# Patient Record
Sex: MALE | Race: WHITE | NOT HISPANIC OR LATINO
[De-identification: names, ages, dates, MRNs, and addresses within clinical notes are randomized per-mention and may not be internally consistent; named-entity substitution may affect disease eponyms.]

---

## 2017-01-23 ENCOUNTER — APPOINTMENT (OUTPATIENT)
Dept: ELECTROPHYSIOLOGY | Facility: HOSPITAL | Age: 82
End: 2017-01-23

## 2017-04-24 ENCOUNTER — APPOINTMENT (OUTPATIENT)
Dept: ELECTROPHYSIOLOGY | Facility: CLINIC | Age: 82
End: 2017-04-24

## 2017-05-05 ENCOUNTER — APPOINTMENT (OUTPATIENT)
Dept: CARDIOLOGY | Facility: CLINIC | Age: 82
End: 2017-05-05

## 2017-05-08 ENCOUNTER — RX RENEWAL (OUTPATIENT)
Age: 82
End: 2017-05-08

## 2017-06-12 ENCOUNTER — APPOINTMENT (OUTPATIENT)
Dept: CARDIOLOGY | Facility: CLINIC | Age: 82
End: 2017-06-12

## 2017-06-28 ENCOUNTER — APPOINTMENT (OUTPATIENT)
Dept: CARDIOLOGY | Facility: CLINIC | Age: 82
End: 2017-06-28

## 2017-07-14 ENCOUNTER — EMERGENCY (EMERGENCY)
Facility: HOSPITAL | Age: 82
LOS: 1 days | End: 2017-07-14
Payer: MEDICARE

## 2017-07-14 PROCEDURE — 99283 EMERGENCY DEPT VISIT LOW MDM: CPT

## 2017-07-24 ENCOUNTER — APPOINTMENT (OUTPATIENT)
Dept: ELECTROPHYSIOLOGY | Facility: CLINIC | Age: 82
End: 2017-07-24
Payer: MEDICARE

## 2017-07-24 PROCEDURE — 93298 REM INTERROG DEV EVAL SCRMS: CPT

## 2017-09-06 ENCOUNTER — APPOINTMENT (OUTPATIENT)
Dept: CARDIOLOGY | Facility: CLINIC | Age: 82
End: 2017-09-06
Payer: MEDICARE

## 2017-09-06 PROCEDURE — 99215 OFFICE O/P EST HI 40 MIN: CPT

## 2017-10-24 ENCOUNTER — RECORD ABSTRACTING (OUTPATIENT)
Age: 82
End: 2017-10-24

## 2017-10-24 DIAGNOSIS — Z82.49 FAMILY HISTORY OF ISCHEMIC HEART DISEASE AND OTHER DISEASES OF THE CIRCULATORY SYSTEM: ICD-10-CM

## 2017-10-24 DIAGNOSIS — Z86.79 PERSONAL HISTORY OF OTHER DISEASES OF THE CIRCULATORY SYSTEM: ICD-10-CM

## 2017-10-24 DIAGNOSIS — R06.02 SHORTNESS OF BREATH: ICD-10-CM

## 2017-10-24 DIAGNOSIS — R25.2 CRAMP AND SPASM: ICD-10-CM

## 2017-10-24 DIAGNOSIS — E03.9 HYPOTHYROIDISM, UNSPECIFIED: ICD-10-CM

## 2017-10-28 ENCOUNTER — EMERGENCY (EMERGENCY)
Facility: HOSPITAL | Age: 82
LOS: 1 days | End: 2017-10-28
Payer: MEDICARE

## 2017-10-28 PROCEDURE — 99283 EMERGENCY DEPT VISIT LOW MDM: CPT

## 2017-10-31 ENCOUNTER — APPOINTMENT (OUTPATIENT)
Dept: ELECTROPHYSIOLOGY | Facility: CLINIC | Age: 82
End: 2017-10-31
Payer: MEDICARE

## 2017-10-31 PROCEDURE — 93294 REM INTERROG EVL PM/LDLS PM: CPT

## 2017-11-01 ENCOUNTER — APPOINTMENT (OUTPATIENT)
Dept: ELECTROPHYSIOLOGY | Facility: CLINIC | Age: 82
End: 2017-11-01

## 2017-11-15 ENCOUNTER — INPATIENT (INPATIENT)
Facility: HOSPITAL | Age: 82
LOS: 0 days | Discharge: HOME | End: 2017-11-16
Payer: MEDICARE

## 2017-11-15 ENCOUNTER — OUTPATIENT (OUTPATIENT)
Dept: OUTPATIENT SERVICES | Facility: HOSPITAL | Age: 82
LOS: 1 days | End: 2017-11-15

## 2017-11-15 PROCEDURE — 72192 CT PELVIS W/O DYE: CPT | Mod: 26

## 2017-11-15 PROCEDURE — 93971 EXTREMITY STUDY: CPT | Mod: 26,RT

## 2017-11-15 PROCEDURE — 74177 CT ABD & PELVIS W/CONTRAST: CPT | Mod: 26

## 2017-11-15 PROCEDURE — 73552 X-RAY EXAM OF FEMUR 2/>: CPT | Mod: 26,RT

## 2017-11-15 PROCEDURE — 74176 CT ABD & PELVIS W/O CONTRAST: CPT | Mod: 26

## 2017-11-15 PROCEDURE — 99285 EMERGENCY DEPT VISIT HI MDM: CPT

## 2017-11-15 PROCEDURE — 73502 X-RAY EXAM HIP UNI 2-3 VIEWS: CPT | Mod: 26,RT

## 2017-11-16 ENCOUNTER — OUTPATIENT (OUTPATIENT)
Dept: OUTPATIENT SERVICES | Facility: HOSPITAL | Age: 82
LOS: 1 days | End: 2017-11-16

## 2017-11-16 PROCEDURE — 99223 1ST HOSP IP/OBS HIGH 75: CPT

## 2017-11-17 ENCOUNTER — APPOINTMENT (OUTPATIENT)
Dept: CARDIOLOGY | Facility: CLINIC | Age: 82
End: 2017-11-17
Payer: MEDICARE

## 2017-11-17 ENCOUNTER — NON-APPOINTMENT (OUTPATIENT)
Age: 82
End: 2017-11-17

## 2017-11-17 VITALS
DIASTOLIC BLOOD PRESSURE: 60 MMHG | SYSTOLIC BLOOD PRESSURE: 122 MMHG | OXYGEN SATURATION: 98 % | HEART RATE: 86 BPM | WEIGHT: 184 LBS | HEIGHT: 67 IN | BODY MASS INDEX: 28.88 KG/M2

## 2017-11-17 PROCEDURE — 93000 ELECTROCARDIOGRAM COMPLETE: CPT

## 2017-11-17 PROCEDURE — 99215 OFFICE O/P EST HI 40 MIN: CPT

## 2017-12-06 ENCOUNTER — APPOINTMENT (OUTPATIENT)
Dept: CARDIOLOGY | Facility: CLINIC | Age: 82
End: 2017-12-06
Payer: MEDICARE

## 2017-12-06 VITALS
BODY MASS INDEX: 28.88 KG/M2 | WEIGHT: 184 LBS | HEIGHT: 67 IN | SYSTOLIC BLOOD PRESSURE: 140 MMHG | OXYGEN SATURATION: 98 % | HEART RATE: 86 BPM | DIASTOLIC BLOOD PRESSURE: 72 MMHG

## 2017-12-06 PROCEDURE — 99215 OFFICE O/P EST HI 40 MIN: CPT

## 2018-01-03 ENCOUNTER — APPOINTMENT (OUTPATIENT)
Dept: CARDIOLOGY | Facility: CLINIC | Age: 83
End: 2018-01-03
Payer: MEDICARE

## 2018-01-03 VITALS
WEIGHT: 184 LBS | OXYGEN SATURATION: 98 % | HEIGHT: 67 IN | DIASTOLIC BLOOD PRESSURE: 78 MMHG | HEART RATE: 90 BPM | BODY MASS INDEX: 28.88 KG/M2 | SYSTOLIC BLOOD PRESSURE: 146 MMHG

## 2018-01-03 PROCEDURE — 99215 OFFICE O/P EST HI 40 MIN: CPT

## 2018-02-05 ENCOUNTER — APPOINTMENT (OUTPATIENT)
Dept: ELECTROPHYSIOLOGY | Facility: CLINIC | Age: 83
End: 2018-02-05

## 2018-05-07 ENCOUNTER — APPOINTMENT (OUTPATIENT)
Dept: ELECTROPHYSIOLOGY | Facility: CLINIC | Age: 83
End: 2018-05-07
Payer: MEDICARE

## 2018-05-07 PROCEDURE — 93296 REM INTERROG EVL PM/IDS: CPT

## 2018-05-07 PROCEDURE — 93294 REM INTERROG EVL PM/LDLS PM: CPT

## 2018-06-06 ENCOUNTER — APPOINTMENT (OUTPATIENT)
Dept: CARDIOLOGY | Facility: CLINIC | Age: 83
End: 2018-06-06
Payer: MEDICARE

## 2018-06-06 ENCOUNTER — NON-APPOINTMENT (OUTPATIENT)
Age: 83
End: 2018-06-06

## 2018-06-06 VITALS
WEIGHT: 182 LBS | DIASTOLIC BLOOD PRESSURE: 70 MMHG | SYSTOLIC BLOOD PRESSURE: 142 MMHG | HEIGHT: 67 IN | BODY MASS INDEX: 28.56 KG/M2 | OXYGEN SATURATION: 97 % | HEART RATE: 84 BPM

## 2018-06-06 PROCEDURE — 99215 OFFICE O/P EST HI 40 MIN: CPT

## 2018-06-06 PROCEDURE — 93000 ELECTROCARDIOGRAM COMPLETE: CPT

## 2018-08-28 ENCOUNTER — APPOINTMENT (OUTPATIENT)
Dept: CARDIOLOGY | Facility: CLINIC | Age: 83
End: 2018-08-28
Payer: MEDICARE

## 2018-08-28 PROCEDURE — 93280 PM DEVICE PROGR EVAL DUAL: CPT

## 2018-08-28 RX ORDER — METOPROLOL SUCCINATE 25 MG/1
25 TABLET, EXTENDED RELEASE ORAL DAILY
Qty: 1 | Refills: 3 | Status: DISCONTINUED | COMMUNITY
Start: 2018-08-28 | End: 2018-08-28

## 2018-09-20 ENCOUNTER — APPOINTMENT (OUTPATIENT)
Dept: CARDIOLOGY | Facility: CLINIC | Age: 83
End: 2018-09-20
Payer: MEDICARE

## 2018-09-20 PROCEDURE — 93306 TTE W/DOPPLER COMPLETE: CPT

## 2018-09-26 ENCOUNTER — APPOINTMENT (OUTPATIENT)
Dept: CARDIOLOGY | Facility: CLINIC | Age: 83
End: 2018-09-26
Payer: MEDICARE

## 2018-09-26 VITALS
BODY MASS INDEX: 28.88 KG/M2 | OXYGEN SATURATION: 98 % | WEIGHT: 184 LBS | DIASTOLIC BLOOD PRESSURE: 70 MMHG | HEIGHT: 67 IN | HEART RATE: 91 BPM | SYSTOLIC BLOOD PRESSURE: 142 MMHG

## 2018-09-26 PROCEDURE — 99215 OFFICE O/P EST HI 40 MIN: CPT

## 2018-11-05 ENCOUNTER — APPOINTMENT (OUTPATIENT)
Dept: ELECTROPHYSIOLOGY | Facility: CLINIC | Age: 83
End: 2018-11-05

## 2018-11-26 ENCOUNTER — MEDICATION RENEWAL (OUTPATIENT)
Age: 83
End: 2018-11-26

## 2018-12-06 ENCOUNTER — OUTPATIENT (OUTPATIENT)
Dept: OUTPATIENT SERVICES | Facility: HOSPITAL | Age: 83
LOS: 1 days | End: 2018-12-06

## 2018-12-27 RX ORDER — ASPIRIN 81 MG
81 TABLET, DELAYED RELEASE (ENTERIC COATED) ORAL DAILY
Refills: 0 | Status: ACTIVE | COMMUNITY

## 2019-01-11 ENCOUNTER — APPOINTMENT (OUTPATIENT)
Dept: CARDIOLOGY | Facility: CLINIC | Age: 84
End: 2019-01-11

## 2019-05-15 ENCOUNTER — APPOINTMENT (OUTPATIENT)
Dept: CARDIOLOGY | Facility: CLINIC | Age: 84
End: 2019-05-15
Payer: MEDICARE

## 2019-05-15 VITALS
OXYGEN SATURATION: 98 % | SYSTOLIC BLOOD PRESSURE: 130 MMHG | HEART RATE: 68 BPM | DIASTOLIC BLOOD PRESSURE: 64 MMHG | BODY MASS INDEX: 29.03 KG/M2 | HEIGHT: 67 IN | WEIGHT: 185 LBS

## 2019-05-15 PROCEDURE — 99215 OFFICE O/P EST HI 40 MIN: CPT

## 2019-05-15 PROCEDURE — 93280 PM DEVICE PROGR EVAL DUAL: CPT

## 2019-05-15 NOTE — DISCUSSION/SUMMARY
[Moderate Aortic Stenosis] : moderate aortic stenosis [Echocardiogram] : echocardiogram [Coronary Artery Disease] : coronary artery disease [Hyperlipidemia] : hyperlipidemia [None] : none [Medication Changes Per Orders] : as documented in orders [Hypertension] : hypertension [Stable] : stable [___ Month(s)] : [unfilled] month(s) [Patient] : the patient [FreeTextEntry1] : I encouraged him to use the Xanax whenever his blood pressure spike and I will see him again in 6 months

## 2019-05-15 NOTE — HISTORY OF PRESENT ILLNESS
[Doing Well] : doing well [None] : The patient has had no significant interval symptoms [Adherent] : the patient is adherent with ~his/her~ medication regimen [FreeTextEntry1] : He has no chest pain\par He has  shortness of breath\par He has no syncope\par He has no palpitations\par He has no edema\par He has no skin rash\par He is neurologically intact [Syncope] : no syncope [PND] : no PND [Easy Bleeding] : no tendency for easy bleeding [Easy Bruising] : no tendency for easy bruising [Wt Gain ___ Lbs] : no recent weight gain [TIA Symptoms] : no TIA symptoms [Symptoms Limit Activities] : ~His/Her~ symptoms do not limit ~his/her~ activities

## 2019-05-15 NOTE — PHYSICAL EXAM
[Well Groomed] : well groomed [General Appearance - Well Developed] : well developed [Normal Appearance] : normal appearance [General Appearance - Well Nourished] : well nourished [No Deformities] : no deformities [Normal Conjunctiva] : the conjunctiva exhibited no abnormalities [General Appearance - In No Acute Distress] : no acute distress [Normal Oral Mucosa] : normal oral mucosa [Eyelids - No Xanthelasma] : the eyelids demonstrated no xanthelasmas [No Oral Cyanosis] : no oral cyanosis [No Oral Pallor] : no oral pallor [Normal Jugular Venous A Waves Present] : normal jugular venous A waves present [Normal Jugular Venous V Waves Present] : normal jugular venous V waves present [No Jugular Venous Bar A Waves] : no jugular venous bar A waves [Exaggerated Use Of Accessory Muscles For Inspiration] : no accessory muscle use [Respiration, Rhythm And Depth] : normal respiratory rhythm and effort [Auscultation Breath Sounds / Voice Sounds] : lungs were clear to auscultation bilaterally [Systolic grade ___/6] : A grade [unfilled]/6 systolic murmur was heard. [Heart Sounds] : normal S1 and S2 [Heart Rate And Rhythm] : heart rate and rhythm were normal [Bowel Sounds] : normal bowel sounds [Abdomen Soft] : soft [Abdomen Tenderness] : non-tender [Abdomen Mass (___ Cm)] : no abdominal mass palpated [Abnormal Walk] : normal gait [Gait - Sufficient For Exercise Testing] : the gait was sufficient for exercise testing [Nail Clubbing] : no clubbing of the fingernails [Cyanosis, Localized] : no localized cyanosis [Petechial Hemorrhages (___cm)] : no petechial hemorrhages [] : no rash [Skin Color & Pigmentation] : normal skin color and pigmentation [No Venous Stasis] : no venous stasis [Skin Lesions] : no skin lesions [No Skin Ulcers] : no skin ulcer [No Xanthoma] : no  xanthoma was observed [Oriented To Time, Place, And Person] : oriented to person, place, and time [Affect] : the affect was normal [No Anxiety] : not feeling anxious [Mood] : the mood was normal

## 2019-05-15 NOTE — REASON FOR VISIT
[Follow-Up - Clinic] : a clinic follow-up of [Aortic Stenosis] : aortic stenosis [Coronary Artery Disease] : coronary artery disease [Hyperlipidemia] : hyperlipidemia [Hypertension] : hypertension [FreeTextEntry1] : I saw this 89-year-old man in followup on  05/15/19\par  He has a history of coronary stents, hyperlipidemia and hypertension. He had  severe aortic stenosis, and had a TAVR 18months  ago, He comes for a followup visit.  \par Because of heart block, he got a PPM\par Because of PAF he was anticoagulated\par \par He is doing well\par

## 2019-05-15 NOTE — PROCEDURE
[No] : not [NSR] : normal sinus rhythm [Pacemaker] : pacemaker [Voltage: ___ volts] : Voltage was [unfilled] volts [Threshold Testing Performed] : Threshold testing was performed [___V @] : [unfilled] V [___ ms] : [unfilled] ms [None] : none [Lead Imp:  ___ohms] : lead impedance was [unfilled] ohms [Sensing Amplitude ___mv] : sensing amplitude was [unfilled] mv [de-identified] : ELY [de-identified] : Advisa [de-identified] : [id number] [de-identified] : 7-[birth year] [de-identified] : CASSIE [de-identified] :  [de-identified] : 6.5 years longevity [de-identified] : AP 78.6%\par  12.7%\par \par 1 episodes of NSVT, (not new) x 2 s, in January 2019. Since then the patient is on beta blockers and no recurrence.\par AF is 19.1%, declines AC secondary to GIB.\par EF 65% by Echo 2016. Negative Cath for ob. CAD 2016.\par \par Next check 3 months either remote or in office per patients preference.

## 2019-07-08 ENCOUNTER — MEDICATION RENEWAL (OUTPATIENT)
Age: 84
End: 2019-07-08

## 2019-07-15 ENCOUNTER — MEDICATION RENEWAL (OUTPATIENT)
Age: 84
End: 2019-07-15

## 2019-09-17 ENCOUNTER — NON-APPOINTMENT (OUTPATIENT)
Age: 84
End: 2019-09-17

## 2019-09-17 ENCOUNTER — APPOINTMENT (OUTPATIENT)
Dept: CARDIOLOGY | Facility: CLINIC | Age: 84
End: 2019-09-17
Payer: MEDICARE

## 2019-09-17 VITALS
HEART RATE: 81 BPM | BODY MASS INDEX: 30.45 KG/M2 | DIASTOLIC BLOOD PRESSURE: 74 MMHG | HEIGHT: 67 IN | WEIGHT: 194 LBS | SYSTOLIC BLOOD PRESSURE: 132 MMHG | OXYGEN SATURATION: 98 %

## 2019-09-17 PROCEDURE — 99215 OFFICE O/P EST HI 40 MIN: CPT

## 2019-09-17 PROCEDURE — 93000 ELECTROCARDIOGRAM COMPLETE: CPT

## 2019-09-17 RX ORDER — ESCITALOPRAM OXALATE 20 MG/1
20 TABLET ORAL
Qty: 30 | Refills: 0 | Status: DISCONTINUED | COMMUNITY
Start: 2017-11-27 | End: 2019-09-17

## 2019-09-17 NOTE — REASON FOR VISIT
[Follow-Up - Clinic] : a clinic follow-up of [Aortic Stenosis] : aortic stenosis [Hyperlipidemia] : hyperlipidemia [Coronary Artery Disease] : coronary artery disease [Hypertension] : hypertension [FreeTextEntry1] : I saw this 89-year-old man in followup on  09/17/19\par  He has a history of coronary stents, hyperlipidemia and hypertension. He had  severe aortic stenosis, and had a TAVR 24months  ago, He comes for a followup visit.  \par Because of heart block, he got a PPM\par Because of PAF he was anticoagulated\par \par He is doing well\par

## 2019-09-17 NOTE — HISTORY OF PRESENT ILLNESS
[Doing Well] : doing well [None] : The patient has had no significant interval symptoms [Adherent] : the patient is adherent with ~his/her~ medication regimen [FreeTextEntry1] : He has no chest pain\par He has  shortness of breath\par He has no syncope\par He has no palpitations\par He has no edema\par He has no skin rash\par He is neurologically intact [Syncope] : no syncope [Easy Bleeding] : no tendency for easy bleeding [PND] : no PND [Easy Bruising] : no tendency for easy bruising [Wt Gain ___ Lbs] : no recent weight gain [TIA Symptoms] : no TIA symptoms [Symptoms Limit Activities] : ~His/Her~ symptoms do not limit ~his/her~ activities

## 2019-09-17 NOTE — DISCUSSION/SUMMARY
[Echocardiogram] : echocardiogram [Moderate Aortic Stenosis] : moderate aortic stenosis [Coronary Artery Disease] : coronary artery disease [Hyperlipidemia] : hyperlipidemia [None] : none [Medication Changes Per Orders] : as documented in orders [Stable] : stable [Hypertension] : hypertension [Patient] : the patient [___ Month(s)] : [unfilled] month(s) [FreeTextEntry1] : I encouraged him to use the Xanax whenever his blood pressure spike and I will see him again in 4 months

## 2019-10-01 ENCOUNTER — OUTPATIENT (OUTPATIENT)
Dept: OUTPATIENT SERVICES | Facility: HOSPITAL | Age: 84
LOS: 1 days | End: 2019-10-01

## 2019-10-29 ENCOUNTER — MEDICATION RENEWAL (OUTPATIENT)
Age: 84
End: 2019-10-29

## 2019-11-04 ENCOUNTER — APPOINTMENT (OUTPATIENT)
Dept: CARDIOLOGY | Facility: CLINIC | Age: 84
End: 2019-11-04
Payer: MEDICARE

## 2019-11-04 PROCEDURE — 93280 PM DEVICE PROGR EVAL DUAL: CPT

## 2019-11-04 NOTE — PROCEDURE
[No] : not [Atrial Fibrillation] : atrial fibrillation [Pacemaker] : pacemaker [Voltage: ___ volts] : Voltage was [unfilled] volts [Threshold Testing Performed] : Threshold testing was performed [Lead Imp:  ___ohms] : lead impedance was [unfilled] ohms [Sensing Amplitude ___mv] : sensing amplitude was [unfilled] mv [___V @] : [unfilled] V [___ ms] : [unfilled] ms [None] : none [Counters Reset] : the counters were reset [de-identified] : ELY [de-identified] : Advisa [de-identified] : [id number] [de-identified] : [birth year] [de-identified] : CASSIE [de-identified] :  [de-identified] : 5.5 years longevity [de-identified] : \par AP 78.6%\par  12.7%\par \par No recurrence of NSVT has been noted. \par \par AF increased to 43.3%\par Educated patient on pathophysiology of atrial fibrillation and natural progression as well as associated risk of cardioembolic event. Informed him on indications for long term anticoagulation. Had GIB on eliquis 5mg BID. \par Consider Watchman device or low dose of eliquis 2.5mg BID. \par He declines and wishes to discuss further with Dr. Harris.\par \par Discussed in detail red flag symptoms of CVA which would warrant emergent medical eval. \par \par Next check 3 months either here or in [place]. \par \par Sincerely,\par \par NANCY Cannon\par Reviewed with supervising MD: Dr. Perez Shay

## 2019-11-27 ENCOUNTER — OUTPATIENT (OUTPATIENT)
Dept: OUTPATIENT SERVICES | Facility: HOSPITAL | Age: 84
LOS: 1 days | End: 2019-11-27

## 2019-12-12 ENCOUNTER — APPOINTMENT (OUTPATIENT)
Dept: CARDIOLOGY | Facility: CLINIC | Age: 84
End: 2019-12-12
Payer: MEDICARE

## 2019-12-12 VITALS
OXYGEN SATURATION: 97 % | WEIGHT: 195 LBS | DIASTOLIC BLOOD PRESSURE: 60 MMHG | SYSTOLIC BLOOD PRESSURE: 120 MMHG | HEIGHT: 67 IN | HEART RATE: 90 BPM | BODY MASS INDEX: 30.61 KG/M2

## 2019-12-12 PROCEDURE — 99215 OFFICE O/P EST HI 40 MIN: CPT

## 2019-12-12 RX ORDER — LOSARTAN POTASSIUM 50 MG/1
50 TABLET, FILM COATED ORAL DAILY
Qty: 180 | Refills: 3 | Status: DISCONTINUED | COMMUNITY
Start: 2017-06-28 | End: 2019-12-12

## 2019-12-12 NOTE — PHYSICAL EXAM
[General Appearance - Well Developed] : well developed [Normal Appearance] : normal appearance [Well Groomed] : well groomed [No Deformities] : no deformities [General Appearance - Well Nourished] : well nourished [General Appearance - In No Acute Distress] : no acute distress [Normal Conjunctiva] : the conjunctiva exhibited no abnormalities [Eyelids - No Xanthelasma] : the eyelids demonstrated no xanthelasmas [Normal Oral Mucosa] : normal oral mucosa [No Oral Pallor] : no oral pallor [No Oral Cyanosis] : no oral cyanosis [Normal Jugular Venous A Waves Present] : normal jugular venous A waves present [Normal Jugular Venous V Waves Present] : normal jugular venous V waves present [No Jugular Venous Bar A Waves] : no jugular venous bar A waves [Respiration, Rhythm And Depth] : normal respiratory rhythm and effort [Exaggerated Use Of Accessory Muscles For Inspiration] : no accessory muscle use [Auscultation Breath Sounds / Voice Sounds] : lungs were clear to auscultation bilaterally [Heart Rate And Rhythm] : heart rate and rhythm were normal [Heart Sounds] : normal S1 and S2 [Systolic grade ___/6] : A grade [unfilled]/6 systolic murmur was heard. [Bowel Sounds] : normal bowel sounds [Abdomen Tenderness] : non-tender [Abdomen Soft] : soft [Abnormal Walk] : normal gait [Abdomen Mass (___ Cm)] : no abdominal mass palpated [Gait - Sufficient For Exercise Testing] : the gait was sufficient for exercise testing [Cyanosis, Localized] : no localized cyanosis [Nail Clubbing] : no clubbing of the fingernails [] : no rash [Skin Color & Pigmentation] : normal skin color and pigmentation [Petechial Hemorrhages (___cm)] : no petechial hemorrhages [No Venous Stasis] : no venous stasis [No Skin Ulcers] : no skin ulcer [Skin Lesions] : no skin lesions [No Xanthoma] : no  xanthoma was observed [Affect] : the affect was normal [Oriented To Time, Place, And Person] : oriented to person, place, and time [Mood] : the mood was normal [No Anxiety] : not feeling anxious

## 2019-12-12 NOTE — HISTORY OF PRESENT ILLNESS
[Doing Well] : doing well [None] : ~He/She~ has no significant interval events [Adherent] : the patient is adherent with ~his/her~ medication regimen [FreeTextEntry1] : He has no chest pain\par He has  shortness of breath\par He has no syncope\par He has no palpitations\par He has no edema\par He has no skin rash\par He is neurologically intact [Syncope] : no syncope [PND] : no PND [Easy Bleeding] : no tendency for easy bleeding [Easy Bruising] : no tendency for easy bruising [TIA Symptoms] : no TIA symptoms [Wt Gain ___ Lbs] : no recent weight gain [Symptoms Limit Activities] : ~His/Her~ symptoms do not limit ~his/her~ activities

## 2019-12-12 NOTE — REASON FOR VISIT
[Follow-Up - Clinic] : a clinic follow-up of [Aortic Stenosis] : aortic stenosis [Coronary Artery Disease] : coronary artery disease [Hyperlipidemia] : hyperlipidemia [Hypertension] : hypertension [FreeTextEntry1] : I saw this 89-year-old man in followup on  12/12/19\par  He has a history of coronary stents, hyperlipidemia and hypertension. He had  severe aortic stenosis, and had a TAVR  30 months  ago, He comes for a followup visit.  \par Because of heart block, he got a PPM\par Because of PAF he was anticoagulated\par \par He is doing well and is off anticoagulation\par

## 2019-12-12 NOTE — DISCUSSION/SUMMARY
[Moderate Aortic Stenosis] : moderate aortic stenosis [Echocardiogram] : echocardiogram [Coronary Artery Disease] : coronary artery disease [None] : none [Hyperlipidemia] : hyperlipidemia [Hypertension] : hypertension [Medication Changes Per Orders] : as documented in orders [Stable] : stable [Patient] : the patient [___ Month(s)] : [unfilled] month(s) [FreeTextEntry1] : I encouraged him to use the Xanax whenever his blood pressure spike and I will see him again in 4 months when he gets back from [place].\par He will remain off anticoagulation.

## 2020-05-20 RX ORDER — LOSARTAN POTASSIUM 50 MG/1
50 TABLET, FILM COATED ORAL DAILY
Qty: 90 | Refills: 3 | Status: DISCONTINUED | COMMUNITY
Start: 1900-01-01 | End: 2020-05-20

## 2020-07-23 ENCOUNTER — APPOINTMENT (OUTPATIENT)
Dept: CARDIOLOGY | Facility: CLINIC | Age: 85
End: 2020-07-23
Payer: MEDICARE

## 2020-07-23 VITALS
SYSTOLIC BLOOD PRESSURE: 122 MMHG | OXYGEN SATURATION: 97 % | BODY MASS INDEX: 29.66 KG/M2 | HEIGHT: 67 IN | HEART RATE: 90 BPM | DIASTOLIC BLOOD PRESSURE: 80 MMHG | TEMPERATURE: 97 F | WEIGHT: 189 LBS

## 2020-07-23 PROCEDURE — 99215 OFFICE O/P EST HI 40 MIN: CPT

## 2020-07-23 PROCEDURE — 93280 PM DEVICE PROGR EVAL DUAL: CPT

## 2020-07-23 NOTE — HISTORY OF PRESENT ILLNESS
[Doing Well] : doing well [None] : The patient has had no significant interval symptoms [Adherent] : the patient is adherent with ~his/her~ medication regimen [FreeTextEntry1] : He has no chest pain\par He has  shortness of breath\par He has no syncope\par He has no palpitations\par He has no edema\par He has no skin rash\par He is neurologically intact [Syncope] : no syncope [PND] : no PND [Easy Bleeding] : no tendency for easy bleeding [TIA Symptoms] : no TIA symptoms [Easy Bruising] : no tendency for easy bruising [Symptoms Limit Activities] : ~His/Her~ symptoms do not limit ~his/her~ activities [Wt Gain ___ Lbs] : no recent weight gain

## 2020-07-23 NOTE — DISCUSSION/SUMMARY
[Moderate Aortic Stenosis] : moderate aortic stenosis [Echocardiogram] : echocardiogram [Coronary Artery Disease] : coronary artery disease [Hyperlipidemia] : hyperlipidemia [None] : none [Hypertension] : hypertension [Medication Changes Per Orders] : as documented in orders [Stable] : stable [___ Month(s)] : [unfilled] month(s) [Patient] : the patient [FreeTextEntry1] : I encouraged him to use the Xanax whenever his blood pressure spike and I will see him again in 4 months \par He will remain off anticoagulation.

## 2020-07-23 NOTE — PHYSICAL EXAM
[General Appearance - Well Developed] : well developed [Well Groomed] : well groomed [General Appearance - Well Nourished] : well nourished [Normal Appearance] : normal appearance [No Deformities] : no deformities [Normal Conjunctiva] : the conjunctiva exhibited no abnormalities [General Appearance - In No Acute Distress] : no acute distress [Eyelids - No Xanthelasma] : the eyelids demonstrated no xanthelasmas [Normal Oral Mucosa] : normal oral mucosa [No Oral Pallor] : no oral pallor [Normal Jugular Venous V Waves Present] : normal jugular venous V waves present [Normal Jugular Venous A Waves Present] : normal jugular venous A waves present [No Oral Cyanosis] : no oral cyanosis [Respiration, Rhythm And Depth] : normal respiratory rhythm and effort [No Jugular Venous Bar A Waves] : no jugular venous bar A waves [Exaggerated Use Of Accessory Muscles For Inspiration] : no accessory muscle use [Heart Rate And Rhythm] : heart rate and rhythm were normal [Auscultation Breath Sounds / Voice Sounds] : lungs were clear to auscultation bilaterally [Heart Sounds] : normal S1 and S2 [Systolic grade ___/6] : A grade [unfilled]/6 systolic murmur was heard. [Bowel Sounds] : normal bowel sounds [Abdomen Soft] : soft [Abdomen Tenderness] : non-tender [Abnormal Walk] : normal gait [Gait - Sufficient For Exercise Testing] : the gait was sufficient for exercise testing [Abdomen Mass (___ Cm)] : no abdominal mass palpated [Cyanosis, Localized] : no localized cyanosis [Nail Clubbing] : no clubbing of the fingernails [Petechial Hemorrhages (___cm)] : no petechial hemorrhages [Skin Color & Pigmentation] : normal skin color and pigmentation [No Venous Stasis] : no venous stasis [Skin Lesions] : no skin lesions [] : no rash [No Skin Ulcers] : no skin ulcer [No Xanthoma] : no  xanthoma was observed [Oriented To Time, Place, And Person] : oriented to person, place, and time [Affect] : the affect was normal [Mood] : the mood was normal [No Anxiety] : not feeling anxious

## 2020-07-23 NOTE — PROCEDURE
[No] : not [Atrial Fibrillation] : atrial fibrillation [Pacemaker] : pacemaker [Voltage: ___ volts] : Voltage was [unfilled] volts [Threshold Testing Performed] : Threshold testing was performed [___V @] : [unfilled] V [Sensing Amplitude ___mv] : sensing amplitude was [unfilled] mv [Lead Imp:  ___ohms] : lead impedance was [unfilled] ohms [___ ms] : [unfilled] ms [Counters Reset] : the counters were reset [None] : none [de-identified] : [id number] [de-identified] : Advisa [de-identified] : [birth year] [de-identified] : ELY [de-identified] :  [de-identified] : 4 years longevity [de-identified] : CASSIE [de-identified] : \par AP 2.3%\par  86.5% (increase in )\par \par Events:\par No ventricular tachycardia\par AF burden increased to 99.4%, overall v-rates controlled\par Educated patient on pathophysiology of atrial fibrillation and natural progression as well as associated risk of cardioembolic event. Informed him on indications for long term anticoagulation. Previously had bleeding event on eliquis. Now on ASA 81mg daily. Chadsvasc of 3. \par \par Office visit with Dr. Harris today to further discuss. \par \par DRC in 3 months; DVC in 6 months \par \par Sincerely,\par \par NANCY Cannon\par Reviewed with supervising MD: Dr. Vinay Harris

## 2020-07-23 NOTE — REASON FOR VISIT
[Follow-Up - Clinic] : a clinic follow-up of [Coronary Artery Disease] : coronary artery disease [Hyperlipidemia] : hyperlipidemia [Aortic Stenosis] : aortic stenosis [Hypertension] : hypertension [FreeTextEntry1] : I saw this 89-year-old man in followup on  07/23/20\par  He has a history of coronary stents, hyperlipidemia and hypertension. He had  severe aortic stenosis, and had a TAVR  30 months  ago, He comes for a followup visit.  \par Because of heart block, he got a PPM\par Because of PAF he was anticoagulated. However, because of spontaneous bleeding, this was stopped.\par Today's device check shows an increased burden of Afib. Decided not to resume anticoagulation\par \par He is doing well and is off anticoagulation\par

## 2020-09-29 ENCOUNTER — OUTPATIENT (OUTPATIENT)
Dept: OUTPATIENT SERVICES | Facility: HOSPITAL | Age: 85
LOS: 1 days | End: 2020-09-29

## 2020-10-20 ENCOUNTER — APPOINTMENT (OUTPATIENT)
Dept: CARDIOLOGY | Facility: CLINIC | Age: 85
End: 2020-10-20

## 2020-10-27 ENCOUNTER — APPOINTMENT (OUTPATIENT)
Dept: CARDIOLOGY | Facility: CLINIC | Age: 85
End: 2020-10-27
Payer: MEDICARE

## 2020-10-27 VITALS
TEMPERATURE: 97.3 F | DIASTOLIC BLOOD PRESSURE: 70 MMHG | HEIGHT: 67 IN | HEART RATE: 75 BPM | OXYGEN SATURATION: 98 % | SYSTOLIC BLOOD PRESSURE: 110 MMHG | WEIGHT: 193 LBS | BODY MASS INDEX: 30.29 KG/M2

## 2020-10-27 PROCEDURE — 99215 OFFICE O/P EST HI 40 MIN: CPT

## 2020-10-27 PROCEDURE — 93280 PM DEVICE PROGR EVAL DUAL: CPT

## 2020-10-27 NOTE — DISCUSSION/SUMMARY
[Moderate Aortic Stenosis] : moderate aortic stenosis [Echocardiogram] : echocardiogram [Coronary Artery Disease] : coronary artery disease [Hyperlipidemia] : hyperlipidemia [None] : none [Hypertension] : hypertension [Stable] : stable [Medication Changes Per Orders] : as documented in orders [Patient] : the patient [___ Month(s)] : [unfilled] month(s) [FreeTextEntry1] : I encouraged him to use the Xanax whenever his blood pressure spike and I will see him again in 4 months \par He will remain off anticoagulation.

## 2020-10-27 NOTE — PHYSICAL EXAM
[General Appearance - Well Developed] : well developed [Normal Appearance] : normal appearance [Well Groomed] : well groomed [General Appearance - Well Nourished] : well nourished [No Deformities] : no deformities [General Appearance - In No Acute Distress] : no acute distress [Normal Conjunctiva] : the conjunctiva exhibited no abnormalities [Eyelids - No Xanthelasma] : the eyelids demonstrated no xanthelasmas [Normal Oral Mucosa] : normal oral mucosa [No Oral Pallor] : no oral pallor [No Oral Cyanosis] : no oral cyanosis [Normal Jugular Venous A Waves Present] : normal jugular venous A waves present [Normal Jugular Venous V Waves Present] : normal jugular venous V waves present [No Jugular Venous Bar A Waves] : no jugular venous bar A waves [Respiration, Rhythm And Depth] : normal respiratory rhythm and effort [Exaggerated Use Of Accessory Muscles For Inspiration] : no accessory muscle use [Auscultation Breath Sounds / Voice Sounds] : lungs were clear to auscultation bilaterally [Heart Rate And Rhythm] : heart rate and rhythm were normal [Heart Sounds] : normal S1 and S2 [Systolic grade ___/6] : A grade [unfilled]/6 systolic murmur was heard. [Bowel Sounds] : normal bowel sounds [Abdomen Soft] : soft [Abdomen Tenderness] : non-tender [Abdomen Mass (___ Cm)] : no abdominal mass palpated [Abnormal Walk] : normal gait [Gait - Sufficient For Exercise Testing] : the gait was sufficient for exercise testing [Nail Clubbing] : no clubbing of the fingernails [Cyanosis, Localized] : no localized cyanosis [Petechial Hemorrhages (___cm)] : no petechial hemorrhages [Skin Color & Pigmentation] : normal skin color and pigmentation [] : no rash [No Venous Stasis] : no venous stasis [Skin Lesions] : no skin lesions [No Skin Ulcers] : no skin ulcer [No Xanthoma] : no  xanthoma was observed [Oriented To Time, Place, And Person] : oriented to person, place, and time [Affect] : the affect was normal [Mood] : the mood was normal [No Anxiety] : not feeling anxious

## 2020-10-27 NOTE — HISTORY OF PRESENT ILLNESS
[Doing Well] : doing well [None] : The patient has had no significant interval symptoms [Adherent] : the patient is adherent with ~his/her~ medication regimen [FreeTextEntry1] : He has no chest pain\par He has  shortness of breath\par He has no syncope\par He has no palpitations\par He has no edema\par He has no skin rash\par He is neurologically intact [Syncope] : no syncope [PND] : no PND [Easy Bleeding] : no tendency for easy bleeding [Easy Bruising] : no tendency for easy bruising [TIA Symptoms] : no TIA symptoms [Wt Gain ___ Lbs] : no recent weight gain [Symptoms Limit Activities] : ~His/Her~ symptoms do not limit ~his/her~ activities

## 2020-10-27 NOTE — REASON FOR VISIT
[Follow-Up - Clinic] : a clinic follow-up of [Aortic Stenosis] : aortic stenosis [Coronary Artery Disease] : coronary artery disease [Hyperlipidemia] : hyperlipidemia [Hypertension] : hypertension [FreeTextEntry1] : I saw this 89-year-old man in followup on  10/27/20\par  He has a history of coronary stents, hyperlipidemia and hypertension. He had  severe aortic stenosis, and had a TAVR  4 years  ago, He comes for a followup visit.  \par Because of heart block, he got a PPM\par Because of PAF he was anticoagulated. However, because of spontaneous bleeding, this was stopped.\par Today's device check shows an increased burden of Afib. Decided not to resume anticoagulation\par \par He is doing well and is off anticoagulation\par

## 2020-10-27 NOTE — PROCEDURE
[No] : not [Atrial Fibrillation] : atrial fibrillation [Pacemaker] : pacemaker [Voltage: ___ volts] : Voltage was [unfilled] volts [Threshold Testing Performed] : Threshold testing was performed [Lead Imp:  ___ohms] : lead impedance was [unfilled] ohms [Sensing Amplitude ___mv] : sensing amplitude was [unfilled] mv [___V @] : [unfilled] V [___ ms] : [unfilled] ms [None] : none [Counters Reset] : the counters were reset [de-identified] : [employer] [de-identified] : Advisa [de-identified] : [id number] [de-identified] : [birth year] [de-identified] : CASSIE [de-identified] :  [de-identified] : 3.5 years longevity [de-identified] : \par AP 6%\par  97%\par \par Events:\par No ventricular tachycardia\par AF burden: 100%\par \par DRC in 3 months; DVC in 6 months

## 2021-01-21 ENCOUNTER — NON-APPOINTMENT (OUTPATIENT)
Age: 86
End: 2021-01-21

## 2021-01-21 ENCOUNTER — APPOINTMENT (OUTPATIENT)
Dept: CARDIOLOGY | Facility: CLINIC | Age: 86
End: 2021-01-21
Payer: MEDICARE

## 2021-01-21 VITALS
BODY MASS INDEX: 30.13 KG/M2 | SYSTOLIC BLOOD PRESSURE: 140 MMHG | HEART RATE: 85 BPM | DIASTOLIC BLOOD PRESSURE: 82 MMHG | WEIGHT: 192 LBS | TEMPERATURE: 97 F | HEIGHT: 67 IN | OXYGEN SATURATION: 99 %

## 2021-01-21 PROCEDURE — 93000 ELECTROCARDIOGRAM COMPLETE: CPT

## 2021-01-21 PROCEDURE — 99215 OFFICE O/P EST HI 40 MIN: CPT

## 2021-01-21 RX ORDER — ESCITALOPRAM OXALATE 10 MG/1
10 TABLET, FILM COATED ORAL DAILY
Refills: 0 | Status: DISCONTINUED | COMMUNITY
End: 2021-01-21

## 2021-01-21 RX ORDER — FAMOTIDINE 20 MG/1
20 TABLET, FILM COATED ORAL
Refills: 0 | Status: DISCONTINUED | COMMUNITY
End: 2021-01-21

## 2021-01-21 RX ORDER — ATENOLOL 25 MG/1
25 TABLET ORAL DAILY
Qty: 45 | Refills: 3 | Status: DISCONTINUED | COMMUNITY
Start: 2018-09-26 | End: 2021-01-21

## 2021-01-21 NOTE — PHYSICAL EXAM
[General Appearance - Well Developed] : well developed [Normal Appearance] : normal appearance [Well Groomed] : well groomed [General Appearance - Well Nourished] : well nourished [No Deformities] : no deformities [General Appearance - In No Acute Distress] : no acute distress [Normal Conjunctiva] : the conjunctiva exhibited no abnormalities [Eyelids - No Xanthelasma] : the eyelids demonstrated no xanthelasmas [Normal Oral Mucosa] : normal oral mucosa [No Oral Pallor] : no oral pallor [No Oral Cyanosis] : no oral cyanosis [Normal Jugular Venous A Waves Present] : normal jugular venous A waves present [Normal Jugular Venous V Waves Present] : normal jugular venous V waves present [No Jugular Venous Bar A Waves] : no jugular venous bar A waves [Respiration, Rhythm And Depth] : normal respiratory rhythm and effort [Exaggerated Use Of Accessory Muscles For Inspiration] : no accessory muscle use [Auscultation Breath Sounds / Voice Sounds] : lungs were clear to auscultation bilaterally [Heart Rate And Rhythm] : heart rate and rhythm were normal [Heart Sounds] : normal S1 and S2 [Systolic grade ___/6] : A grade [unfilled]/6 systolic murmur was heard. [Bowel Sounds] : normal bowel sounds [Abdomen Tenderness] : non-tender [Abdomen Soft] : soft [Abdomen Mass (___ Cm)] : no abdominal mass palpated [Abnormal Walk] : normal gait [Gait - Sufficient For Exercise Testing] : the gait was sufficient for exercise testing [Nail Clubbing] : no clubbing of the fingernails [Cyanosis, Localized] : no localized cyanosis [Petechial Hemorrhages (___cm)] : no petechial hemorrhages [Skin Color & Pigmentation] : normal skin color and pigmentation [] : no rash [No Venous Stasis] : no venous stasis [No Skin Ulcers] : no skin ulcer [Skin Lesions] : no skin lesions [No Xanthoma] : no  xanthoma was observed [Oriented To Time, Place, And Person] : oriented to person, place, and time [Affect] : the affect was normal [Mood] : the mood was normal [No Anxiety] : not feeling anxious

## 2021-01-21 NOTE — REASON FOR VISIT
[Follow-Up - Clinic] : a clinic follow-up of [Aortic Stenosis] : aortic stenosis [Coronary Artery Disease] : coronary artery disease [Hyperlipidemia] : hyperlipidemia [Hypertension] : hypertension [FreeTextEntry1] : I saw this 89-year-old man in followup on  01/21/21\par  He has a history of coronary stents, hyperlipidemia and hypertension. He had  severe aortic stenosis, and had a TAVR  4 years  ago, He comes for a followup visit.  \par Because of heart block, he got a PPM\par Because of PAF he was anticoagulated. However, because of spontaneous bleeding, this was stopped.\par A  device check showed  an increased burden of Afib. Decided not to resume anticoagulation\par \par He is doing well and is off anticoagulation\par

## 2021-04-16 ENCOUNTER — NON-APPOINTMENT (OUTPATIENT)
Age: 86
End: 2021-04-16

## 2021-04-18 ENCOUNTER — EMERGENCY (EMERGENCY)
Facility: HOSPITAL | Age: 86
LOS: 1 days | End: 2021-04-18
Admitting: EMERGENCY MEDICINE
Payer: MEDICARE

## 2021-04-18 PROCEDURE — 71045 X-RAY EXAM CHEST 1 VIEW: CPT | Mod: 26

## 2021-04-18 PROCEDURE — 99285 EMERGENCY DEPT VISIT HI MDM: CPT

## 2021-04-18 PROCEDURE — 93970 EXTREMITY STUDY: CPT | Mod: 26

## 2021-04-18 PROCEDURE — 93010 ELECTROCARDIOGRAM REPORT: CPT

## 2021-04-21 ENCOUNTER — APPOINTMENT (OUTPATIENT)
Dept: CARDIOLOGY | Facility: CLINIC | Age: 86
End: 2021-04-21
Payer: MEDICARE

## 2021-04-21 VITALS
OXYGEN SATURATION: 99 % | DIASTOLIC BLOOD PRESSURE: 62 MMHG | TEMPERATURE: 98 F | WEIGHT: 186 LBS | BODY MASS INDEX: 29.19 KG/M2 | HEART RATE: 82 BPM | SYSTOLIC BLOOD PRESSURE: 112 MMHG | HEIGHT: 67 IN

## 2021-04-21 PROCEDURE — 99214 OFFICE O/P EST MOD 30 MIN: CPT

## 2021-04-21 RX ORDER — AMOXICILLIN 500 MG/1
500 CAPSULE ORAL
Qty: 4 | Refills: 0 | Status: DISCONTINUED | COMMUNITY
Start: 2018-11-26 | End: 2021-04-21

## 2021-04-21 NOTE — HISTORY OF PRESENT ILLNESS
[FreeTextEntry1] : I saw this 89-year-old man in followup on  4/21/2021\par  He has a history of coronary stents, hyperlipidemia and hypertension. He had  severe aortic stenosis, and had a TAVR.  \par Because of heart block, he got a PPM\par Because of PAF he was anticoagulated. However, because of spontaneous bleeding, this was stopped.\par A  device check showed  an increased burden of Afib. Decided not to resume anticoagulation\par \par He is doing well and is off anticoagulation.\par \par Patient has noticed an increased in lower extremity swelling. This has been occurring for 2-3 weeks. Better in the morning when he gets up. Worse at night. He went to ED and they did venous dopplers which did not demonstrate DVTs. He has no chest pain, SOB, or palpitations.

## 2021-04-21 NOTE — REASON FOR VISIT
[Follow-Up - Clinic] : a clinic follow-up of [Aortic Stenosis] : aortic stenosis [Coronary Artery Disease] : coronary artery disease [Hyperlipidemia] : hyperlipidemia [Hypertension] : hypertension [FreeTextEntry1] : \par

## 2021-04-21 NOTE — PHYSICAL EXAM
[General Appearance - Well Developed] : well developed [Normal Appearance] : normal appearance [Well Groomed] : well groomed [General Appearance - Well Nourished] : well nourished [No Deformities] : no deformities [General Appearance - In No Acute Distress] : no acute distress [Normal Conjunctiva] : the conjunctiva exhibited no abnormalities [Eyelids - No Xanthelasma] : the eyelids demonstrated no xanthelasmas [Respiration, Rhythm And Depth] : normal respiratory rhythm and effort [Exaggerated Use Of Accessory Muscles For Inspiration] : no accessory muscle use [Auscultation Breath Sounds / Voice Sounds] : lungs were clear to auscultation bilaterally [Heart Rate And Rhythm] : heart rate and rhythm were normal [Heart Sounds] : normal S1 and S2 [Systolic grade ___/6] : A grade [unfilled]/6 systolic murmur was heard. [Abdomen Mass (___ Cm)] : no abdominal mass palpated [Abnormal Walk] : normal gait [Gait - Sufficient For Exercise Testing] : the gait was sufficient for exercise testing [Nail Clubbing] : no clubbing of the fingernails [Cyanosis, Localized] : no localized cyanosis [Petechial Hemorrhages (___cm)] : no petechial hemorrhages [] : no ischemic changes [FreeTextEntry1] : 2-3+ pitting edemal of the bilateral lower extremities.

## 2021-04-21 NOTE — DISCUSSION/SUMMARY
[Patient] : the patient [___ Month(s)] : [unfilled] month(s) [FreeTextEntry1] : 1. Aortic Stenosis: s/p TAVR in the past. Given the lower extremity edema will obtain echocardiogram.\par \par 2. Lower Extremity Edema: I suspect venous insufficiency. No DVT on venous dopplers in ED on 4/18/2021. I am recommending discontinuing felodipine and HCTZ. Recommend starting Lasix 40mg daily. Recommend elevation and low salt diet. \par \par 3. HTN: recommend discontinuing felodipine and HCTZ as described above. Increase losartan from 50mg-->100mg daily. \par \par 4. Hx of 3rd degree AV Block: s//p PM. Regularly scheduled device checks. \par \par 5. Hx of PAF: appears rate controlled. No AC given bleeding history. \par \par 6. HLD: continue simvastatin 20mg daily.\par \par Follow up after echocardiogram.

## 2021-04-27 RX ORDER — FUROSEMIDE 40 MG/1
40 TABLET ORAL DAILY
Qty: 90 | Refills: 3 | Status: DISCONTINUED | COMMUNITY
Start: 2021-04-21 | End: 2021-04-27

## 2021-05-04 ENCOUNTER — APPOINTMENT (OUTPATIENT)
Dept: CARDIOLOGY | Facility: CLINIC | Age: 86
End: 2021-05-04
Payer: MEDICARE

## 2021-05-04 VITALS
WEIGHT: 185 LBS | DIASTOLIC BLOOD PRESSURE: 84 MMHG | SYSTOLIC BLOOD PRESSURE: 138 MMHG | HEART RATE: 97 BPM | BODY MASS INDEX: 29.03 KG/M2 | OXYGEN SATURATION: 98 % | HEIGHT: 67 IN

## 2021-05-04 PROCEDURE — 93306 TTE W/DOPPLER COMPLETE: CPT

## 2021-05-04 PROCEDURE — 99215 OFFICE O/P EST HI 40 MIN: CPT

## 2021-05-04 NOTE — REASON FOR VISIT
[Structural Heart and Valve Disease] : structural heart and valve disease [Follow-Up - Clinic] : a clinic follow-up of [Aortic Stenosis] : aortic stenosis [Coronary Artery Disease] : coronary artery disease [Hyperlipidemia] : hyperlipidemia [Hypertension] : hypertension [FreeTextEntry3] : Dr. Abbasi [FreeTextEntry1] : I saw this 89-year-old man in followup on  05/04/21\par  He has a history of coronary stents, hyperlipidemia and hypertension. He had  severe aortic stenosis, and had a TAVR  4 years  ago, He comes for a followup visit.  \par Because of heart block, he got a PPM\par Because of PAF he was anticoagulated. However, because of spontaneous bleeding, this was stopped.\par A  device check showed  an increased burden of Afib. Decided not to resume anticoagulation\par \par He is doing well and is off anticoagulation\par

## 2021-05-04 NOTE — DISCUSSION/SUMMARY
[Moderate Aortic Stenosis] : moderate aortic stenosis [Echocardiogram] : echocardiogram [Coronary Artery Disease] : coronary artery disease [Hyperlipidemia] : hyperlipidemia [Hypertension] : hypertension [Stable] : stable [Patient] : the patient [___ Month(s)] : in [unfilled] month(s) [FreeTextEntry1] : I encouraged him to use the Xanax whenever his blood pressure spike and I will see him again in 2 months \par He will remain off anticoagulation.\par He has a problem with pedal edema, because of spending excessive amounts of time sitting in a chair, because of back issues, he has to sleep in the chair upright.\par Adding diuretics has helped with the edema, and I have encouraged him to elevate his legs when sitting in a chair and to try and walk as much as possible.

## 2021-07-27 ENCOUNTER — APPOINTMENT (OUTPATIENT)
Dept: CARDIOLOGY | Facility: CLINIC | Age: 86
End: 2021-07-27
Payer: MEDICARE

## 2021-07-27 VITALS
WEIGHT: 192 LBS | HEART RATE: 80 BPM | TEMPERATURE: 98 F | SYSTOLIC BLOOD PRESSURE: 132 MMHG | BODY MASS INDEX: 30.07 KG/M2 | OXYGEN SATURATION: 98 % | DIASTOLIC BLOOD PRESSURE: 70 MMHG

## 2021-07-27 PROCEDURE — 99214 OFFICE O/P EST MOD 30 MIN: CPT

## 2021-07-27 NOTE — DISCUSSION/SUMMARY
[FreeTextEntry1] : 1. Aortic Stenosis: s/p TAVR in the past. Normally functioning on last echocardiogram from May 2021. \par \par 2. Lower Extremity Edema: I suspect venous insufficiency. No DVT on venous dopplers in ED on 4/18/2021. I am recommended discontinuing felodipine and HCTZ and started Lasix 40mg daily in the past. This helped. Recommend compression stocking for the left lower extremity. Recommend elevation and low salt diet. \par \par 3. HTN: recommend discontinuing felodipine and HCTZ as described above. Continue losartan 100mg daily. \par \par 4. Hx of 3rd degree AV Block: s//p PM. Regularly scheduled device checks advised \par \par 5. Hx of PAF: appears rate controlled. No AC given bleeding history. \par \par 6. HLD: continue simvastatin 20mg daily.

## 2021-07-27 NOTE — REVIEW OF SYSTEMS
[Fever] : no fever [Chills] : no chills [Feeling Fatigued] : feeling fatigued [Cough] : no cough [Wheezing] : no wheezing [Joint Pain] : joint pain [Joint Stiffness] : joint stiffness [Negative] : Neurological [FreeTextEntry5] : see HPI

## 2021-07-27 NOTE — HISTORY OF PRESENT ILLNESS
[FreeTextEntry1] : 89 year old male with history of coronary stents, hyperlipidemia and hypertension. He had severe aortic stenosis, and had a TAVR. Because of heart block, he got a PPM. Because of PAF he was anticoagulated. However, because of spontaneous bleeding, this was stopped.\par \par He is doing well and is off anticoagulation.\par \par Patient has left lower extremity edema. Better in the morning and worse toward the end of the day. Patient had his edema resolve in the right lower extremity. Venous dopplers in April 2021, did not show DVTs.

## 2021-07-27 NOTE — PHYSICAL EXAM
[Normal] : moves all extremities, no focal deficits, normal speech [de-identified] : no carotid bruits auscultated bilaterally [de-identified] : ambulates slowly with cane [de-identified] : 1-2 + pitting edema left lower extremity, trace pitting edema right lower extremity

## 2021-07-27 NOTE — CARDIOLOGY SUMMARY
[de-identified] : 4/18/2021, Underlying atrial fibrillation with V paced rhythm  [de-identified] : 5/4/2021, LV EF 55-60%, MAC with mild-moderate MR, Bioprosthetic AV that is normally functioning, mild LVH, moderate TR with estimated PASP of 46mmHg.

## 2021-08-12 ENCOUNTER — APPOINTMENT (OUTPATIENT)
Dept: CARDIOLOGY | Facility: CLINIC | Age: 86
End: 2021-08-12
Payer: MEDICARE

## 2021-08-12 VITALS — DIASTOLIC BLOOD PRESSURE: 90 MMHG | SYSTOLIC BLOOD PRESSURE: 140 MMHG

## 2021-08-12 PROCEDURE — 93280 PM DEVICE PROGR EVAL DUAL: CPT

## 2021-08-12 NOTE — PROCEDURE
[No] : not [Atrial Fibrillation] : atrial fibrillation [Pacemaker] : pacemaker [Voltage: ___ volts] : Voltage was [unfilled] volts [Threshold Testing Performed] : Threshold testing was performed [Lead Imp:  ___ohms] : lead impedance was [unfilled] ohms [Sensing Amplitude ___mv] : sensing amplitude was [unfilled] mv [___V @] : [unfilled] V [___ ms] : [unfilled] ms [None] : none [Counters Reset] : the counters were reset [de-identified] : Medtronic [de-identified] : [employer] [de-identified] : [id number] [de-identified] : 7-19-16 [de-identified] : CASSIE [de-identified] :  [de-identified] : 3.5 years longevity [de-identified] : \par AP 3.4%\par  98.4%\par \par Events:\par No ventricular tachycardia\par AF burden: 100%\par \par Pt brings in home monitor.  Can't connect to base.  New remote monitor has been ordered.  F/U in 2 weeks to assist in setting it up.  BP today on my exam was 136/62mmHg.  Pt's home machine is inaccurate.  148/88mmHg.  \par \par Once remote base monitor has been set up, he will start 3 month DRC alternating with 6 month DVC.

## 2021-08-26 ENCOUNTER — APPOINTMENT (OUTPATIENT)
Dept: CARDIOLOGY | Facility: CLINIC | Age: 86
End: 2021-08-26
Payer: MEDICARE

## 2021-08-26 ENCOUNTER — NON-APPOINTMENT (OUTPATIENT)
Age: 86
End: 2021-08-26

## 2021-08-26 VITALS
SYSTOLIC BLOOD PRESSURE: 136 MMHG | HEART RATE: 82 BPM | WEIGHT: 190 LBS | TEMPERATURE: 98 F | BODY MASS INDEX: 29.82 KG/M2 | OXYGEN SATURATION: 97 % | HEIGHT: 67 IN | DIASTOLIC BLOOD PRESSURE: 70 MMHG

## 2021-08-26 DIAGNOSIS — I35.0 NONRHEUMATIC AORTIC (VALVE) STENOSIS: ICD-10-CM

## 2021-08-26 PROCEDURE — 93000 ELECTROCARDIOGRAM COMPLETE: CPT

## 2021-08-26 PROCEDURE — 99215 OFFICE O/P EST HI 40 MIN: CPT

## 2021-08-26 NOTE — DISCUSSION/SUMMARY
[Moderate Aortic Stenosis] : moderate aortic stenosis [Echocardiogram] : echocardiogram [Coronary Artery Disease] : coronary artery disease [Hyperlipidemia] : hyperlipidemia [Hypertension] : hypertension [Stable] : stable [Patient] : the patient [___ Month(s)] : in [unfilled] month(s) [FreeTextEntry1] : I encouraged him to use the Xanax whenever his blood pressure spike and I will see him again in 4 months \par He will remain off anticoagulation.\par He has a problem with pedal edema, because of spending excessive amounts of time sitting in a chair, because of back issues, he has to sleep in the chair upright.\par Adding diuretics has helped with the edema, and I have encouraged him to elevate his legs when sitting in a chair and to try and walk as much as possible.

## 2021-08-26 NOTE — REASON FOR VISIT
[Structural Heart and Valve Disease] : structural heart and valve disease [Follow-Up - Clinic] : a clinic follow-up of [Aortic Stenosis] : aortic stenosis [Coronary Artery Disease] : coronary artery disease [Hyperlipidemia] : hyperlipidemia [Hypertension] : hypertension [FreeTextEntry3] : Dr. Abbasi [FreeTextEntry1] : I saw this 89-year-old man in followup on  08/26/21\par  He has a history of coronary stents, hyperlipidemia and hypertension. 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  \par Because of heart block, he got a PPM\par Because of PAF he was anticoagulated. However, because of spontaneous bleeding, this was stopped.\par A  device check showed  an increased burden of Afib. Decided not to resume anticoagulation\par \par He is doing well and is off anticoagulation\par Because of a sedentary existence, because of his age, he has frequent edema of his legs, which seems reasonably well controlled on Lasix 40 mg daily\par

## 2021-08-26 NOTE — HISTORY OF PRESENT ILLNESS
[Doing Well] : doing well [None] : The patient has had no significant interval symptoms [Adherent] : the patient is adherent with ~his/her~ medication regimen [FreeTextEntry1] : He has no chest pain\par He has  shortness of breath\par He has no syncope\par He has no palpitations\par He has trace edema\par He has no skin rash\par He is neurologically intact [Syncope] : no syncope [PND] : no PND [Easy Bleeding] : no tendency for easy bleeding [Easy Bruising] : no tendency for easy bruising [TIA Symptoms] : no TIA symptoms [Wt Gain ___ Lbs] : no recent weight gain [Symptoms Limit Activities] : ~His/Her~ symptoms do not limit ~his/her~ activities

## 2021-09-15 ENCOUNTER — APPOINTMENT (OUTPATIENT)
Dept: CARDIOLOGY | Facility: CLINIC | Age: 86
End: 2021-09-15
Payer: MEDICARE

## 2021-09-15 PROCEDURE — 93280 PM DEVICE PROGR EVAL DUAL: CPT

## 2021-09-15 NOTE — PROCEDURE
[No] : not [Atrial Fibrillation] : atrial fibrillation [Pacemaker] : pacemaker [Voltage: ___ volts] : Voltage was [unfilled] volts [Threshold Testing Performed] : Threshold testing was performed [Lead Imp:  ___ohms] : lead impedance was [unfilled] ohms [Sensing Amplitude ___mv] : sensing amplitude was [unfilled] mv [___V @] : [unfilled] V [___ ms] : [unfilled] ms [None] : none [Counters Reset] : the counters were reset [de-identified] : Medtronic [de-identified] : Advisa [de-identified] : [id number] [de-identified] : 7-19-[birth year] [de-identified] : CASSIE [de-identified] :  [de-identified] : 2.5 years longevity [de-identified] : \par AP 2.2%\par  97.4%\par \par Events:\par No ventricular tachycardia\par AF burden: 100%\par \par Remote monitor set up today and transmission confirmed.  \par \par 3 month DRC alternating with 6 month DVC.\par \par Pts information was confirmed in [employer]: 9/15/21

## 2021-11-11 ENCOUNTER — APPOINTMENT (OUTPATIENT)
Dept: CARDIOLOGY | Facility: CLINIC | Age: 86
End: 2021-11-11
Payer: MEDICARE

## 2021-11-11 ENCOUNTER — NON-APPOINTMENT (OUTPATIENT)
Age: 86
End: 2021-11-11

## 2021-11-11 PROCEDURE — 93294 REM INTERROG EVL PM/LDLS PM: CPT | Mod: NC

## 2021-11-11 PROCEDURE — 93296 REM INTERROG EVL PM/IDS: CPT | Mod: NC

## 2021-11-16 ENCOUNTER — OUTPATIENT (OUTPATIENT)
Dept: OUTPATIENT SERVICES | Facility: HOSPITAL | Age: 86
LOS: 1 days | End: 2021-11-16

## 2021-12-30 ENCOUNTER — APPOINTMENT (OUTPATIENT)
Dept: CARDIOLOGY | Facility: CLINIC | Age: 86
End: 2021-12-30
Payer: MEDICARE

## 2021-12-30 VITALS
BODY MASS INDEX: 30.61 KG/M2 | TEMPERATURE: 97.7 F | HEIGHT: 67 IN | HEART RATE: 90 BPM | DIASTOLIC BLOOD PRESSURE: 82 MMHG | SYSTOLIC BLOOD PRESSURE: 140 MMHG | WEIGHT: 195 LBS | OXYGEN SATURATION: 98 %

## 2021-12-30 PROCEDURE — 99215 OFFICE O/P EST HI 40 MIN: CPT

## 2021-12-30 NOTE — DISCUSSION/SUMMARY
[Moderate Aortic Stenosis] : moderate aortic stenosis [Echocardiogram] : echocardiogram [Coronary Artery Disease] : coronary artery disease [Hyperlipidemia] : hyperlipidemia [Hypertension] : hypertension [Stable] : stable [Patient] : the patient [___ Month(s)] : in [unfilled] month(s) [FreeTextEntry1] : I encouraged him to use the Xanax whenever his blood pressure spike and I will see him again in 3 months \par He will remain off anticoagulation.\par He has a problem with pedal edema, because of spending excessive amounts of time sitting in a chair, because of back issues, he has to sleep in the chair upright.\par Adding diuretics has helped with the edema, and I have encouraged him to elevate his legs when sitting in a chair and to try and walk as much as possible.\par He will take Lasix 40 mg a day but 3 days a week we will increase it to 80 mg

## 2021-12-30 NOTE — REASON FOR VISIT
Patient:   Gloria Nicholas            MRN: [id number]            FIN: [id number]              Age:   80 years     Sex:  MALE     :  32   Associated Diagnoses:   None   Author:   CHRISTINE MARIE     CC- SVT    History of Present Illness: 80-year-old gentleman admitted following a mechanical fall. Apparently the patient had an episode of SVT in the emergency room. Unfortunately, none of this is documented in the medical record and I am not able to review any of the telemetry strips. Here in the floor the patient has been sinus rhythm. He does have a history of paroxysmal atrial fibrillation with an elevated chads vascular score. At the present  time, the patient is completely asymptomatic. He denies any angina, dyspnea, orthopnea, PND, significant palpitations, presyncope or syncope. Review of Systems:      Constitutional: In no acute distress. Cardiac: See HPI. Respiratory: See HPI. HEENT: Denies dizziness, nose bleeds. Gastrointestinal: Denies nausea, hematemesis, melana or hematochezia     Genitourinary: Denies hematuria. Muskuloskeletal: Denies muscle aches or cramping. Neurological:Denies syncope or faintness. Hematologic: Denies easy bruising or bleeding. Skin: Denies rash or itching. Endocrine: Denies unexplained weight change or unexplained weekness. Past Medical History: [ ]   COPD. 2. History of hemorrhagic stroke with permanent left-sided hemiplegia in . 3. Dyslipidemia. 4. Atrial fibrillation with a CHADS-VASc score of 4 on Eliquis 5 mg b.i.d.  5. Chronic kidney disease. 6. Glaucoma. Past Surgical History: [ Dorian Springfield has had prostatectomy for prostate surgery and  inguinal hernia repair.       Family History: [ ] Reviewed    Social History: [ ] Reviewed     Health Status   Current medications: Medications (15) Active  Scheduled: (10)  ApixaBAN 5 mg tab  5 mg 1 tab, Oral, BID  Atorvastatin 10 mg tab  10 mg 1 tab, Oral, Q Bedtime  Baclofen 10 mg tab  20 mg 2 tab, Oral, Q Evening  Budesonide-formoterol 160-4.5 mcg oral MDI 6 gm  2 puff, Inhaled, BID  Cyclobenzaprine 5 mg tab  5 mg 1 tab, Oral, Q Evening  DilTIAZem 120 mg CD cap  120 mg 1 cap, Oral, Daily  Pantoprazole 40 mg DR tab  40 mg 1 tab, Oral, BID  Polyethylene glycol 3350 oral recon powder 17 gm packet UD  17 gm 1 packet, Oral, Daily  Senna 8.6 mg tab  8.6 mg 1 tab, Oral, Daily  Sertraline 25 mg tab  25 mg 1 tab, Oral, Daily  Continuous: (0)  PRN: (5)  Acetaminophen 325 mg tab  650 mg 2 tab, Oral, Q6H  Albuterol 0.083% 2.5 mg/3 mL nebulizer soln  2.5 mg 3 mL, Inhaled, Q4H  Albuterol-ipratropium 2.5-0.5 mg/3 mL nebulizer soln  3 mL, Nebulizer, Q4H  Bisacodyl 10 mg suppos  10 mg 1 suppository, Rectal, Daily  HydrALAZINE 20 mg/1 mL inj SDV  10 mg 0.5 mL, Slow IV Push, Q6H   , Home Medications (10) Active  acetaminophen-hydrocodone oral 325-10 mg tablet 1 tab, PRN, Oral, Daily  albuterol inhalation 0.083% 2.5 mg/3 mL solution (Proventil) 2.5 mg = 3 mL, PRN, Inhaled, Q4H  baclofen oral 20 mg tablet (Lioresal) 20 mg = 1 tab, Oral, Q Evening  Cartia  mg/24 hours oral capsule, extended release 120 mg = 1 cap, Oral, Daily [with lunch]  cyclobenzaprine oral 5 mg tablet (Flexeril) 5 mg = 1 tab, Oral, Q Evening  Eliquis oral 5 mg tablet 5 mg = 1 tab, Oral, BID  Lipitor oral 10 mg tablet 10 mg = 1 tab, Oral, Q Bedtime  omeprazole 40 mg oral delayed release capsule 40 mg = 1 cap, Oral, Daily  sertraline oral 25 mg tablet 25 mg = 1 tab, Oral, Daily  Symbicort inhaler oral 160-4.5 mcg/puff 2 puff, Inhaled, BID      Allergies:  Allergies (2) Active Reaction  influenza virus vaccine, H1N1, None Documented  live  swine flu vaccine None Documented        Physical Examination   VS/Measurements     Vitals between:   08-FEB-2019 08:50:24   TO   09-FEB-2019 08:50:24                   LAST RESULT MINIMUM MAXIMUM  Temperature 36.1 36.1 36.8  Heart Rate 87 81 105  Respiratory Rate 18 15 20  NISBP           151 130 166  NIDBP           62 59 82  NIMBP           86 83 97  SpO2                    99 93 100   ,    Dosing Weight:   74 kg    02/08/2019 11:48  Most Recent Clinical Weight:   74  kg    02/08/2019 11:48      Intake and Output     I & O between:  08-FEB-2019 08:50 TO 09-FEB-2019 08:50  Med Dosing Weight:  74  kg   08-FEB-2019  24 Hour Intake:   0.00  ( 0.00 mL/kg )  24 Hour Output:   0.00           24 Hour Urine/Stool Output:   0.0  24 Hour Balance:   0.00           24 Hour Urine Output:   0.00  ( 0.00 mL/kg/hr )                   Urine Count:  3.00    Stool Count:  2.00          Constitutional: In no acute distress. HEENT: Conjunctiva pink, sclera white. Eyelids normal. Oral mucosa pink. No JVD. Cardiovascular: RRR, S1 & S2 present, no murmurs, rubs, or gallops. PMI normal. No carotid bruit bilaterally. Abdominal aorta non-palpable. Femoral pulses +2 bilaterally. DP/PT pulses +2 bilaterally. Respiratory: Even, unlabored respirations. Lungs clear bilaterally, no wheezes or crackles. Abdomen: Non-tender. No hepatomegaly. Extremeties: No nail clubbing or cyanosis. No bilateral lower extremity edema. Skin: Pink, warm, and dry. Musculoskeletal: Appropriate muscle strength and tone. Neurological: Alert and oriented x 3. Appropriate mood and affect.      Review / Management   Laboratory Results     Labs between:  08-FEB-2019 08:50 to 09-FEB-2019 08:50    CBC:                 WBC  HgB  Hct  Plt  MCV  RDW   09-FEB-2019 9.3  (L) 8.3  (L) 27.3  360  (L) 72.4  (H) 18.5   08-FEB-2019 (H) 11.5  (L) 9.1  (L) 30.3  386  (L) 73.9  (H) 18.4     DIFF:                 Seg  Neutroph//ABS  Lymph//ABS  Mono//ABS  EOS/ABS  [id number] NOT APPLICABLE  79 // (H) 9.1  7 // (L) 0.8  11 // (H) 1.3  2 // 0.2    BMP:                 Na  Cl  BUN  Glu   09-FEB-2019 139  105  15  96                              K  CO2  Cr  Ca                              3.6  25  0.93  8.4   BMP:                 Na  Cl  BUN  Glu 08-FEB-2019 140  103  16  96                              K  CO2  Cr  Ca                              3.8  25  1.07  8.9     CMP:                 AST  ALT  AlkPhos  Bili  Albumin   09-FEB-2019 13  9  84  0.5  (L) 3.0   08-FEB-2019 12  15  103  0.6  (L) 3.5     COAG:                 INR  PT  PTT  Ddimer  Fibrinogen    08-FEB-2019 1.1  11.8  (H) 39                          Radiology results     Result title:  CT HEAD OR BRAIN WO CON  Result status:  Final  Verified by:  TIM ZHOU on 02/08/2019 2:38  IMPRESSION:Age-related changes in the brain with no evidence for an acute intracranial process. Result title:  XR CHEST PORTABLE 1V  Result status:  Final  Verified by:  Navdeep Means on 02/08/2019 4:19  IMPRESSION:1.  Slightly underinflated lungs, with no evidence of an acute infiltrate. Assessment/Plan:   1. [] Paroxysmal atrial fibrillation-chads vascular score of 6 (9.8% yearly risk of stroke), has bled score of 3 (5.8%  risk of bleeding)  2. [] History of CVA  3. []  4. []      Patient is currently hemodynamically stable, he is on Cardizem. I reviewed telemetry monitoring and there are no sustained arrhythmias but I was able to locate. There are no strips in the chart demonstrating any SVT. Therefore, would continue the Cardizem and Eliquis. I did stress to the patient that he should use a walker if that gives him more support, his chads vascular score is very high, he is currently on Eliquis. There is no  evidence of any intracranial bleeding on his CT scan. No further recommendations from a cardiac standpoint and he can follow-up with us as an outpatient. [Structural Heart and Valve Disease] : structural heart and valve disease [Follow-Up - Clinic] : a clinic follow-up of [Aortic Stenosis] : aortic stenosis [Coronary Artery Disease] : coronary artery disease [Hyperlipidemia] : hyperlipidemia [Hypertension] : hypertension [FreeTextEntry3] : Dr. Abbasi [FreeTextEntry1] : I saw this 89-year-old man in followup on  12/30/21\par  He has a history of coronary stents, hyperlipidemia and hypertension. 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  \par Because of heart block, he got a PPM\par Because of PAF he was anticoagulated. However, because of spontaneous bleeding, this was stopped.\par A  device check showed  an increased burden of Afib. Decided not to resume anticoagulation\par \par He is doing well and is off anticoagulation\par Because of a sedentary existence, because of his age, he has frequent edema of his legs, which seems reasonably well controlled on Lasix 40 mg daily\par I increased his Lasix to 80 mg 3 times a week and 40 mg the rest of the days\par

## 2021-12-30 NOTE — PHYSICAL EXAM
[General Appearance - Well Developed] : well developed [Normal Appearance] : normal appearance [Well Groomed] : well groomed [General Appearance - Well Nourished] : well nourished [No Deformities] : no deformities [General Appearance - In No Acute Distress] : no acute distress [Normal Conjunctiva] : the conjunctiva exhibited no abnormalities [Eyelids - No Xanthelasma] : the eyelids demonstrated no xanthelasmas [Normal Oral Mucosa] : normal oral mucosa [No Oral Pallor] : no oral pallor [No Oral Cyanosis] : no oral cyanosis [Normal Jugular Venous A Waves Present] : normal jugular venous A waves present [Normal Jugular Venous V Waves Present] : normal jugular venous V waves present [No Jugular Venous Bar A Waves] : no jugular venous bar A waves [Respiration, Rhythm And Depth] : normal respiratory rhythm and effort [Exaggerated Use Of Accessory Muscles For Inspiration] : no accessory muscle use [Auscultation Breath Sounds / Voice Sounds] : lungs were clear to auscultation bilaterally [Heart Rate And Rhythm] : heart rate and rhythm were normal [Heart Sounds] : normal S1 and S2 [Systolic grade ___/6] : A grade [unfilled]/6 systolic murmur was heard. [Bowel Sounds] : normal bowel sounds [Abdomen Soft] : soft [Abdomen Tenderness] : non-tender [Abdomen Mass (___ Cm)] : no abdominal mass palpated [Abnormal Walk] : normal gait [Gait - Sufficient For Exercise Testing] : the gait was sufficient for exercise testing [Nail Clubbing] : no clubbing of the fingernails [Cyanosis, Localized] : no localized cyanosis [Petechial Hemorrhages (___cm)] : no petechial hemorrhages [Skin Color & Pigmentation] : normal skin color and pigmentation [] : no rash [No Venous Stasis] : no venous stasis [Skin Lesions] : no skin lesions [No Skin Ulcers] : no skin ulcer [No Xanthoma] : no  xanthoma was observed [Affect] : the affect was normal [Oriented To Time, Place, And Person] : oriented to person, place, and time [Mood] : the mood was normal [No Anxiety] : not feeling anxious

## 2022-02-04 PROCEDURE — 73030 X-RAY EXAM OF SHOULDER: CPT | Mod: RT

## 2022-02-04 PROCEDURE — 73080 X-RAY EXAM OF ELBOW: CPT | Mod: RT

## 2022-02-10 ENCOUNTER — APPOINTMENT (OUTPATIENT)
Dept: CARDIOLOGY | Facility: CLINIC | Age: 87
End: 2022-02-10

## 2022-02-22 ENCOUNTER — APPOINTMENT (OUTPATIENT)
Dept: CARDIOLOGY | Facility: CLINIC | Age: 87
End: 2022-02-22

## 2022-03-02 ENCOUNTER — APPOINTMENT (OUTPATIENT)
Dept: CARDIOLOGY | Facility: CLINIC | Age: 87
End: 2022-03-02

## 2022-03-11 ENCOUNTER — APPOINTMENT (OUTPATIENT)
Dept: CARDIOLOGY | Facility: CLINIC | Age: 87
End: 2022-03-11
Payer: MEDICARE

## 2022-03-11 VITALS
BODY MASS INDEX: 29.76 KG/M2 | OXYGEN SATURATION: 97 % | SYSTOLIC BLOOD PRESSURE: 140 MMHG | DIASTOLIC BLOOD PRESSURE: 80 MMHG | HEART RATE: 82 BPM | TEMPERATURE: 98.5 F | WEIGHT: 190 LBS

## 2022-03-11 PROCEDURE — 93280 PM DEVICE PROGR EVAL DUAL: CPT

## 2022-03-11 NOTE — PROCEDURE
[No] : not [Atrial Fibrillation] : atrial fibrillation [Pacemaker] : pacemaker [Voltage: ___ volts] : Voltage was [unfilled] volts [Threshold Testing Performed] : Threshold testing was performed [Lead Imp:  ___ohms] : lead impedance was [unfilled] ohms [Sensing Amplitude ___mv] : sensing amplitude was [unfilled] mv [___V @] : [unfilled] V [___ ms] : [unfilled] ms [None] : none [Counters Reset] : the counters were reset [de-identified] : Medtronic [de-identified] : Advisa [de-identified] : [id number] [de-identified] : 7-19-16 [de-identified] : CASSIE [de-identified] :  [de-identified] : 2 years longevity [de-identified] : \par AP 2.0%\par  94.6%\par \par Events:\par No ventricular tachycardia\par AF burden: 100%\par \par Pt refusing any further remote monitoring.  Will do in office checks. \par \par 3 month DVC.  OV with Dr. Harris same day. \par \par Pts information was confirmed in [employer]: 9/15/21

## 2022-04-03 RX ORDER — FUROSEMIDE 40 MG/1
40 TABLET ORAL DAILY
Qty: 90 | Refills: 0 | Status: ACTIVE | COMMUNITY

## 2022-04-03 RX ORDER — LOSARTAN POTASSIUM 50 MG/1
50 TABLET, FILM COATED ORAL TWICE DAILY
Qty: 180 | Refills: 0 | Status: ACTIVE | COMMUNITY

## 2022-04-19 ENCOUNTER — NON-APPOINTMENT (OUTPATIENT)
Age: 87
End: 2022-04-19

## 2022-04-21 ENCOUNTER — NON-APPOINTMENT (OUTPATIENT)
Age: 87
End: 2022-04-21

## 2022-04-21 ENCOUNTER — APPOINTMENT (OUTPATIENT)
Dept: CARDIOLOGY | Facility: CLINIC | Age: 87
End: 2022-04-21
Payer: MEDICARE

## 2022-04-21 VITALS
HEART RATE: 80 BPM | SYSTOLIC BLOOD PRESSURE: 142 MMHG | DIASTOLIC BLOOD PRESSURE: 80 MMHG | BODY MASS INDEX: 29.82 KG/M2 | WEIGHT: 190 LBS | OXYGEN SATURATION: 95 % | TEMPERATURE: 97.3 F | HEIGHT: 67 IN

## 2022-04-21 DIAGNOSIS — I47.2 VENTRICULAR TACHYCARDIA: ICD-10-CM

## 2022-04-21 DIAGNOSIS — R60.0 LOCALIZED EDEMA: ICD-10-CM

## 2022-04-21 DIAGNOSIS — I25.10 ATHEROSCLEROTIC HEART DISEASE OF NATIVE CORONARY ARTERY W/OUT ANGINA PECTORIS: ICD-10-CM

## 2022-04-21 PROCEDURE — 93000 ELECTROCARDIOGRAM COMPLETE: CPT

## 2022-04-21 PROCEDURE — 99215 OFFICE O/P EST HI 40 MIN: CPT

## 2022-04-21 NOTE — REASON FOR VISIT
[Arrhythmia/ECG Abnorrmalities] : arrhythmia/ECG abnormalities [Structural Heart and Valve Disease] : structural heart and valve disease [Follow-Up - Clinic] : a clinic follow-up of [Aortic Stenosis] : aortic stenosis [Coronary Artery Disease] : coronary artery disease [Hyperlipidemia] : hyperlipidemia [Hypertension] : hypertension [FreeTextEntry3] : Dr. Abbasi [FreeTextEntry1] : I saw this 89-year-old man in followup on  04/21/22\par  He has a history of coronary stents, hyperlipidemia and hypertension. 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  \par Because of heart block, he got a PPM\par Because of PAF he was anticoagulated. However, because of spontaneous bleeding, this was stopped.\par A  device check showed  an increased burden of Afib. Decided not to resume anticoagulation\par \par He is doing well and is off anticoagulation\par Because of a sedentary existence, because of his age, he has frequent edema of his legs, which seems reasonably well controlled on Lasix 40 mg daily\par I increased his Lasix to 80 mg 3 times a week and 40 mg the rest of the days\par With topical cream his leg rashes seems to have improved considerably.\par

## 2022-04-21 NOTE — PHYSICAL EXAM
[General Appearance - Well Developed] : well developed [Normal Appearance] : normal appearance [Well Groomed] : well groomed [General Appearance - Well Nourished] : well nourished [No Deformities] : no deformities [General Appearance - In No Acute Distress] : no acute distress [Normal Conjunctiva] : the conjunctiva exhibited no abnormalities [Eyelids - No Xanthelasma] : the eyelids demonstrated no xanthelasmas [Normal Oral Mucosa] : normal oral mucosa [No Oral Pallor] : no oral pallor [No Oral Cyanosis] : no oral cyanosis [Normal Jugular Venous A Waves Present] : normal jugular venous A waves present [Normal Jugular Venous V Waves Present] : normal jugular venous V waves present [No Jugular Venous Bar A Waves] : no jugular venous bar A waves [Respiration, Rhythm And Depth] : normal respiratory rhythm and effort [Exaggerated Use Of Accessory Muscles For Inspiration] : no accessory muscle use [Auscultation Breath Sounds / Voice Sounds] : lungs were clear to auscultation bilaterally [Heart Rate And Rhythm] : heart rate and rhythm were normal [Heart Sounds] : normal S1 and S2 [Systolic grade ___/6] : A grade [unfilled]/6 systolic murmur was heard. [Bowel Sounds] : normal bowel sounds [Abdomen Soft] : soft [Abdomen Tenderness] : non-tender [Abdomen Mass (___ Cm)] : no abdominal mass palpated [Abnormal Walk] : normal gait [Gait - Sufficient For Exercise Testing] : the gait was sufficient for exercise testing [Cyanosis, Localized] : no localized cyanosis [Nail Clubbing] : no clubbing of the fingernails [Petechial Hemorrhages (___cm)] : no petechial hemorrhages [Skin Color & Pigmentation] : normal skin color and pigmentation [] : no rash [No Venous Stasis] : no venous stasis [Skin Lesions] : no skin lesions [No Skin Ulcers] : no skin ulcer [No Xanthoma] : no  xanthoma was observed [Oriented To Time, Place, And Person] : oriented to person, place, and time [Affect] : the affect was normal [Mood] : the mood was normal [No Anxiety] : not feeling anxious

## 2022-06-10 ENCOUNTER — APPOINTMENT (OUTPATIENT)
Dept: CARDIOLOGY | Facility: CLINIC | Age: 87
End: 2022-06-10

## 2022-06-16 ENCOUNTER — APPOINTMENT (OUTPATIENT)
Dept: CARDIOLOGY | Facility: CLINIC | Age: 87
End: 2022-06-16
Payer: MEDICARE

## 2022-06-16 VITALS
WEIGHT: 190 LBS | BODY MASS INDEX: 29.82 KG/M2 | OXYGEN SATURATION: 96 % | SYSTOLIC BLOOD PRESSURE: 140 MMHG | HEART RATE: 80 BPM | TEMPERATURE: 97.3 F | HEIGHT: 67 IN | DIASTOLIC BLOOD PRESSURE: 80 MMHG

## 2022-06-16 PROCEDURE — 93280 PM DEVICE PROGR EVAL DUAL: CPT

## 2022-06-16 NOTE — PROCEDURE
[No] : not [Atrial Fibrillation] : atrial fibrillation [Pacemaker] : pacemaker [Voltage: ___ volts] : Voltage was [unfilled] volts [Threshold Testing Performed] : Threshold testing was performed [Lead Imp:  ___ohms] : lead impedance was [unfilled] ohms [Sensing Amplitude ___mv] : sensing amplitude was [unfilled] mv [___V @] : [unfilled] V [___ ms] : [unfilled] ms [None] : none [Counters Reset] : the counters were reset [de-identified] : Medtronic [de-identified] : Advisa [de-identified] : [id number] [de-identified] : 7-19-16 [de-identified] :  [de-identified] : CASSIE [de-identified] : 2 years longevity [de-identified] : \par AP 2.1%\par  97.3%\par \par Events:\par No ventricular tachycardia\par AF burden: 100%\par \par Pt refusing any further remote monitoring.  Will do in office checks. \par \par 3 month DVC.  OV with Dr. Harris same day. \par \par Pts information was confirmed in [employer]: 9/15/21

## 2022-06-22 ENCOUNTER — OUTPATIENT (OUTPATIENT)
Dept: OUTPATIENT SERVICES | Facility: HOSPITAL | Age: 87
LOS: 1 days | End: 2022-06-22

## 2022-06-22 DIAGNOSIS — R73.09 OTHER ABNORMAL GLUCOSE: ICD-10-CM

## 2022-06-22 DIAGNOSIS — D72.819 DECREASED WHITE BLOOD CELL COUNT, UNSPECIFIED: ICD-10-CM

## 2022-06-22 DIAGNOSIS — E78.5 HYPERLIPIDEMIA, UNSPECIFIED: ICD-10-CM

## 2022-06-22 DIAGNOSIS — I10 ESSENTIAL (PRIMARY) HYPERTENSION: ICD-10-CM

## 2022-06-22 DIAGNOSIS — E03.9 HYPOTHYROIDISM, UNSPECIFIED: ICD-10-CM

## 2022-06-28 RX ORDER — AMOXICILLIN 500 MG/1
500 TABLET, FILM COATED ORAL
Qty: 4 | Refills: 0 | Status: DISCONTINUED | COMMUNITY
Start: 2021-08-03 | End: 2022-06-28

## 2022-06-30 ENCOUNTER — NON-APPOINTMENT (OUTPATIENT)
Age: 87
End: 2022-06-30

## 2022-07-07 ENCOUNTER — APPOINTMENT (OUTPATIENT)
Dept: CARDIOLOGY | Facility: CLINIC | Age: 87
End: 2022-07-07

## 2022-07-07 ENCOUNTER — NON-APPOINTMENT (OUTPATIENT)
Age: 87
End: 2022-07-07

## 2022-07-07 VITALS
DIASTOLIC BLOOD PRESSURE: 80 MMHG | WEIGHT: 196 LBS | BODY MASS INDEX: 30.76 KG/M2 | OXYGEN SATURATION: 94 % | TEMPERATURE: 96.8 F | HEIGHT: 67 IN | HEART RATE: 81 BPM | SYSTOLIC BLOOD PRESSURE: 132 MMHG

## 2022-07-07 DIAGNOSIS — R60.0 LOCALIZED EDEMA: ICD-10-CM

## 2022-07-07 DIAGNOSIS — I10 ESSENTIAL (PRIMARY) HYPERTENSION: ICD-10-CM

## 2022-07-07 DIAGNOSIS — Z95.2 PRESENCE OF PROSTHETIC HEART VALVE: ICD-10-CM

## 2022-07-07 DIAGNOSIS — Z00.00 ENCOUNTER FOR GENERAL ADULT MEDICAL EXAMINATION W/OUT ABNORMAL FINDINGS: ICD-10-CM

## 2022-07-07 DIAGNOSIS — E78.5 HYPERLIPIDEMIA, UNSPECIFIED: ICD-10-CM

## 2022-07-07 DIAGNOSIS — I44.2 ATRIOVENTRICULAR BLOCK, COMPLETE: ICD-10-CM

## 2022-07-07 DIAGNOSIS — I48.0 PAROXYSMAL ATRIAL FIBRILLATION: ICD-10-CM

## 2022-07-07 DIAGNOSIS — Z95.0 PRESENCE OF CARDIAC PACEMAKER: ICD-10-CM

## 2022-07-07 DIAGNOSIS — F41.9 ANXIETY DISORDER, UNSPECIFIED: ICD-10-CM

## 2022-07-07 PROCEDURE — 93000 ELECTROCARDIOGRAM COMPLETE: CPT

## 2022-07-07 PROCEDURE — 99215 OFFICE O/P EST HI 40 MIN: CPT

## 2022-07-07 RX ORDER — MUPIROCIN 20 MG/G
2 OINTMENT TOPICAL
Refills: 0 | Status: DISCONTINUED | COMMUNITY
End: 2022-07-07

## 2022-07-07 RX ORDER — ESCITALOPRAM OXALATE 10 MG/1
10 TABLET ORAL DAILY
Qty: 30 | Refills: 3 | Status: DISCONTINUED | COMMUNITY
Start: 2021-05-04 | End: 2022-07-07

## 2022-07-07 RX ORDER — FLUTICASONE PROPIONATE 50 UG/1
50 SPRAY, METERED NASAL DAILY
Refills: 0 | Status: DISCONTINUED | COMMUNITY
End: 2022-07-07

## 2022-07-07 NOTE — PHYSICAL EXAM
[General Appearance - Well Developed] : well developed [Normal Appearance] : normal appearance [Well Groomed] : well groomed [General Appearance - Well Nourished] : well nourished [No Deformities] : no deformities [General Appearance - In No Acute Distress] : no acute distress [Normal Conjunctiva] : the conjunctiva exhibited no abnormalities [Eyelids - No Xanthelasma] : the eyelids demonstrated no xanthelasmas [Normal Oral Mucosa] : normal oral mucosa [No Oral Pallor] : no oral pallor [No Oral Cyanosis] : no oral cyanosis [Normal Jugular Venous A Waves Present] : normal jugular venous A waves present [Normal Jugular Venous V Waves Present] : normal jugular venous V waves present [No Jugular Venous Bar A Waves] : no jugular venous bar A waves [Respiration, Rhythm And Depth] : normal respiratory rhythm and effort [Exaggerated Use Of Accessory Muscles For Inspiration] : no accessory muscle use [Auscultation Breath Sounds / Voice Sounds] : lungs were clear to auscultation bilaterally [Heart Rate And Rhythm] : heart rate and rhythm were normal [Heart Sounds] : normal S1 and S2 [Systolic grade ___/6] : A grade [unfilled]/6 systolic murmur was heard. [Bowel Sounds] : normal bowel sounds [Abdomen Tenderness] : non-tender [Abdomen Soft] : soft [Abdomen Mass (___ Cm)] : no abdominal mass palpated [Abnormal Walk] : normal gait [Gait - Sufficient For Exercise Testing] : the gait was sufficient for exercise testing [Nail Clubbing] : no clubbing of the fingernails [Cyanosis, Localized] : no localized cyanosis [Petechial Hemorrhages (___cm)] : no petechial hemorrhages [Skin Color & Pigmentation] : normal skin color and pigmentation [] : no rash [No Venous Stasis] : no venous stasis [Skin Lesions] : no skin lesions [No Skin Ulcers] : no skin ulcer [No Xanthoma] : no  xanthoma was observed [Oriented To Time, Place, And Person] : oriented to person, place, and time [Affect] : the affect was normal [Mood] : the mood was normal [No Anxiety] : not feeling anxious

## 2022-07-07 NOTE — BEGINNING OF VISIT
Subjective:       Patient ID: Trino Orellana is a 60 y.o. male.    Chief Complaint: Numbness    HPI:  Patient pain and some numbness in right heel then noticed pain in right leg ache any said felt like it was some swelling.  It is been there a few weeks and not getting better.  He has been flying back and forth to [place] for mom's state.  He denies any redness or warmth.  He has tried changing shoes but has not tried ice or anti-inflammatories.  His back has been hurting but he is not sure that is related  He has his physical upcoming as well and would like to schedule labs.      Review of Systems   Constitutional: Negative for activity change and unexpected weight change.   HENT: Negative for hearing loss, rhinorrhea and trouble swallowing.    Eyes: Negative for discharge and visual disturbance.   Respiratory: Negative for chest tightness and wheezing.    Cardiovascular: Negative for chest pain and palpitations.   Gastrointestinal: Negative for blood in stool, constipation, diarrhea and vomiting.   Endocrine: Negative for polydipsia and polyuria.   Genitourinary: Negative for difficulty urinating, hematuria and urgency.   Musculoskeletal: Positive for arthralgias (Right heel), back pain and myalgias ( medial right). Negative for joint swelling and neck pain.   Neurological: Negative for weakness and headaches.   Psychiatric/Behavioral: Negative for confusion and dysphoric mood.       Objective:      Physical Exam   Constitutional: He is oriented to person, place, and time. He appears well-developed and well-nourished.   HENT:   Head: Normocephalic and atraumatic.   Cardiovascular: Normal rate, regular rhythm and intact distal pulses.   Musculoskeletal: He exhibits tenderness (Right heel, more tender than numb). He exhibits no deformity.   Normal range of motion knee and ankle.  Tenderness at the mid aspect of the medial right calf adjacent to the tibia   Neurological: He is alert and oriented to person, place,  [Patient] : patient [Spouse] : spouse and time.   Skin: Capillary refill takes less than 2 seconds. No rash noted. No erythema.   Psychiatric: He has a normal mood and affect. His behavior is normal.       Assessment:       1. Right leg pain    2. Right calf pain    3. Varicose vein of leg    4. Routine physical examination        Plan:       Trino was seen today for numbness.    Diagnoses and all orders for this visit:    Right leg pain  -     US Lower Extremity Veins Right; Future  -     CAR Ultrasound doppler venous leg right; Future  -     CAR Ultrasound doppler venous leg right; Future    Right calf pain  -     US Lower Extremity Veins Right; Future  -     CAR Ultrasound doppler venous leg right; Future  -     CAR Ultrasound doppler venous leg right; Future    Varicose vein of leg  -     US Lower Extremity Veins Right; Future  -     CAR Ultrasound doppler venous leg right; Future  -     CAR Ultrasound doppler venous leg right; Future    Routine physical examination  -     Lipid panel; Future  -     PSA, Screening; Future  -     CBC auto differential; Future  -     Comprehensive metabolic panel; Future           [Family Member] : family member

## 2022-07-07 NOTE — REASON FOR VISIT
[Arrhythmia/ECG Abnorrmalities] : arrhythmia/ECG abnormalities [Structural Heart and Valve Disease] : structural heart and valve disease [Follow-Up - Clinic] : a clinic follow-up of [Aortic Stenosis] : aortic stenosis [Coronary Artery Disease] : coronary artery disease [Hyperlipidemia] : hyperlipidemia [Hypertension] : hypertension [FreeTextEntry3] : Dr. Abbasi [FreeTextEntry1] : I saw this 89-year-old man in followup on  07/07/22\par He comes in today with both of his sons, because there is concern about behavior at home where he is excessively aggressive and had periods where he was not totally aware of his surroundings.  Question is whether this was a TIA or dementia.\par  He has a history of coronary stents, hyperlipidemia and hypertension. 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  \par Because of heart block, he got a PPM\par Because of PAF he was anticoagulated. However, because of spontaneous bleeding, this was stopped.\par A  device check showed  an increased burden of Afib. Decided not to resume anticoagulation\par \par He is doing well and is off anticoagulation\par Because of a sedentary existence, because of his age, he has frequent edema of his legs, which seems reasonably well controlled on Lasix 40 mg daily\par I increased his Lasix to 80 mg 3 times a week and 40 mg the rest of the days\par With topical cream his leg rashes seems to have improved considerably.\par

## 2022-07-07 NOTE — HISTORY OF PRESENT ILLNESS
[Doing Well] : doing well [None] : The patient has had no significant interval symptoms [Adherent] : the patient is adherent with ~his/her~ medication regimen [FreeTextEntry1] : He has no chest pain\par He has  shortness of breath\par He has no syncope\par He has no palpitations\par He has trace edema\par He has no skin rash\par He is neurologically intact today, but his family relates abnormal behavior at home [Syncope] : no syncope [PND] : no PND [Easy Bleeding] : no tendency for easy bleeding [Easy Bruising] : no tendency for easy bruising [TIA Symptoms] : no TIA symptoms [Wt Gain ___ Lbs] : no recent weight gain [Symptoms Limit Activities] : ~His/Her~ symptoms do not limit ~his/her~ activities

## 2022-07-07 NOTE — LETTER BODY
[To Whom it May Concern:] : To Whom it May Concern: [FreeTextEntry1] : Farrukh is a patient currently under my care. He was seen in my office on 7/7/2022. Farrukh is a 89 year old male who has multiple cardiac issues & extensive cardiac history. He has began to show signs of early dementia behavior. Please feel free to reach out to my office with any questions. [FreeTextEntry3] : Dr. Vinay Harris

## 2022-07-15 ENCOUNTER — NON-APPOINTMENT (OUTPATIENT)
Age: 87
End: 2022-07-15

## 2022-07-25 ENCOUNTER — EMERGENCY (EMERGENCY)
Facility: HOSPITAL | Age: 87
LOS: 1 days | Discharge: HOME | End: 2022-07-25
Admitting: EMERGENCY MEDICINE

## 2022-07-25 DIAGNOSIS — Z91.81 HISTORY OF FALLING: ICD-10-CM

## 2022-07-25 DIAGNOSIS — Z76.89 PERSONS ENCOUNTERING HEALTH SERVICES IN OTHER SPECIFIED CIRCUMSTANCES: ICD-10-CM
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
## 2022-07-25 DIAGNOSIS — F03.90 UNSPECIFIED DEMENTIA WITHOUT BEHAVIORAL DISTURBANCE: ICD-10-CM

## 2022-07-25 DIAGNOSIS — R29.6 REPEATED FALLS: ICD-10-CM

## 2022-07-25 DIAGNOSIS — S20.419A ABRASION OF UNSPECIFIED BACK WALL OF THORAX, INITIAL ENCOUNTER: ICD-10-CM

## 2022-07-25 DIAGNOSIS — I10 ESSENTIAL (PRIMARY) HYPERTENSION: ICD-10-CM
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
## 2022-07-25 PROCEDURE — 70450 CT HEAD/BRAIN W/O DYE: CPT | Mod: 26,ME

## 2022-07-25 PROCEDURE — G1004: CPT

## 2022-07-25 PROCEDURE — 71045 X-RAY EXAM CHEST 1 VIEW: CPT | Mod: 26

## 2022-07-25 PROCEDURE — 93010 ELECTROCARDIOGRAM REPORT: CPT

## 2022-07-25 PROCEDURE — 99285 EMERGENCY DEPT VISIT HI MDM: CPT

## 2022-08-09 ENCOUNTER — EMERGENCY (EMERGENCY)
Facility: HOSPITAL | Age: 87
LOS: 1 days | Discharge: HOME | End: 2022-08-09
Attending: EMERGENCY MEDICINE
Payer: MEDICARE

## 2022-08-09 VITALS
TEMPERATURE: 98 F | DIASTOLIC BLOOD PRESSURE: 84 MMHG | HEART RATE: 60 BPM | RESPIRATION RATE: 18 BRPM | SYSTOLIC BLOOD PRESSURE: 186 MMHG | OXYGEN SATURATION: 99 %

## 2022-08-09 VITALS
RESPIRATION RATE: 18 BRPM | DIASTOLIC BLOOD PRESSURE: 90 MMHG | TEMPERATURE: 98 F | HEIGHT: 70 IN | OXYGEN SATURATION: 98 % | HEART RATE: 63 BPM | SYSTOLIC BLOOD PRESSURE: 182 MMHG

## 2022-08-09 PROCEDURE — 96372 THER/PROPH/DIAG INJ SC/IM: CPT | Mod: XU

## 2022-08-09 PROCEDURE — 99285 EMERGENCY DEPT VISIT HI MDM: CPT | Mod: GC

## 2022-08-09 PROCEDURE — 96374 THER/PROPH/DIAG INJ IV PUSH: CPT

## 2022-08-09 PROCEDURE — G1004: CPT

## 2022-08-09 PROCEDURE — 70450 CT HEAD/BRAIN W/O DYE: CPT | Mod: 26,MG

## 2022-08-09 PROCEDURE — 70450 CT HEAD/BRAIN W/O DYE: CPT | Mod: MG

## 2022-08-09 PROCEDURE — 72125 CT NECK SPINE W/O DYE: CPT | Mod: 26,MG

## 2022-08-09 PROCEDURE — 72125 CT NECK SPINE W/O DYE: CPT | Mod: MG

## 2022-08-09 PROCEDURE — 99285 EMERGENCY DEPT VISIT HI MDM: CPT | Mod: 25

## 2022-08-09 RX ORDER — QUETIAPINE FUMARATE 200 MG/1
25 TABLET, FILM COATED ORAL ONCE
Refills: 0 | Status: COMPLETED | OUTPATIENT
Start: 2022-08-09 | End: 2022-08-09

## 2022-08-09 RX ORDER — HALOPERIDOL DECANOATE 100 MG/ML
5 INJECTION INTRAMUSCULAR ONCE
Refills: 0 | Status: COMPLETED | OUTPATIENT
Start: 2022-08-09 | End: 2022-08-09

## 2022-08-09 RX ORDER — FUROSEMIDE 40 MG
40 TABLET ORAL ONCE
Refills: 0 | Status: COMPLETED | OUTPATIENT
Start: 2022-08-09 | End: 2022-08-09

## 2022-08-09 RX ORDER — LOSARTAN POTASSIUM 100 MG/1
50 TABLET, FILM COATED ORAL ONCE
Refills: 0 | Status: COMPLETED | OUTPATIENT
Start: 2022-08-09 | End: 2022-08-09

## 2022-08-09 RX ORDER — MIDAZOLAM HYDROCHLORIDE 1 MG/ML
2 INJECTION, SOLUTION INTRAMUSCULAR; INTRAVENOUS ONCE
Refills: 0 | Status: DISCONTINUED | OUTPATIENT
Start: 2022-08-09 | End: 2022-08-09

## 2022-08-09 RX ORDER — HALOPERIDOL DECANOATE 100 MG/ML
2.5 INJECTION INTRAMUSCULAR ONCE
Refills: 0 | Status: COMPLETED | OUTPATIENT
Start: 2022-08-09 | End: 2022-08-09

## 2022-08-09 RX ADMIN — Medication 0.2 MILLIGRAM(S): at 21:01

## 2022-08-09 RX ADMIN — HALOPERIDOL DECANOATE 2.5 MILLIGRAM(S): 100 INJECTION INTRAMUSCULAR at 10:47

## 2022-08-09 RX ADMIN — MIDAZOLAM HYDROCHLORIDE 2 MILLIGRAM(S): 1 INJECTION, SOLUTION INTRAMUSCULAR; INTRAVENOUS at 15:55

## 2022-08-09 RX ADMIN — LOSARTAN POTASSIUM 50 MILLIGRAM(S): 100 TABLET, FILM COATED ORAL at 17:27

## 2022-08-09 RX ADMIN — HALOPERIDOL DECANOATE 5 MILLIGRAM(S): 100 INJECTION INTRAMUSCULAR at 21:01

## 2022-08-09 NOTE — ED PROVIDER NOTE - ATTENDING CONTRIBUTION TO CARE
89yo M with PMHx of HTN, CAD, dementia who presents to the ED after eloping from the dementia unit at [facility] and falling on the sidewalk. Patient received IM haldol for agitation. CT Head and C spine obtained to evaluate for intracranial and c spine injury was negative.

## 2022-08-09 NOTE — ED ADULT TRIAGE NOTE - CHIEF COMPLAINT QUOTE
Pt BIBA, pt wandered off of dementia unit at [facility], found on sidewalk by staff member, abrasions noted to B/L knees, hematoma noted to occiput, c-collar in place, AOx2 at baseline Pt BIBA, pt wandered off of dementia unit at [facility], found lying on sidewalk by staff member, abrasions noted to B/L knees, hematoma noted to occiput, unknown LOC, denies blood thinners, c-collar in place, AOx2 at baseline

## 2022-08-09 NOTE — ED PROVIDER NOTE - CLINICAL SUMMARY MEDICAL DECISION MAKING FREE TEXT BOX
Patient is a 89yo M with PMHx of HTN, CAD, dementia who presents to the ED after eloping from the dementia unit at [facility] and falling on the sidewalk. Patient received IM haldol for agitation. CT Head and C spine obtained to evaluate for intracranial and c spine injury. Patient is a 89yo M with PMHx of HTN, CAD, dementia who presents to the ED after eloping from the dementia unit at [facility] and falling on the sidewalk. Patient received IM haldol for agitation. CT Head and C spine obtained to evaluate for intracranial and c spine injury was negative.

## 2022-08-09 NOTE — ED ADULT NURSE REASSESSMENT NOTE - NS ED NURSE REASSESS COMMENT FT1
pt unable to sit still for CT scan; pt increasingly agitated., per MD made aware ; will medicate as per MD orders.

## 2022-08-09 NOTE — ED ADULT NURSE REASSESSMENT NOTE - NS ED NURSE REASSESS COMMENT FT1
pt refusing to take PO meds. requesting to leave; refusing to cooperate. MD aware. pt to be medicated prior to CT scan

## 2022-08-09 NOTE — ED PROVIDER NOTE - OBJECTIVE STATEMENT
Patient is a 89yo M with PMHx of HTN, CAD, dementia who presents to the ED after eloping from the dementia unit at [facility] and falling on the sidewalk. The fall was unwitnessed. Patient is unable to give any history, unknown if there was any LOC. Denies any headache, chest pain, shortness of breath. Received report from EMS: Patient has had worsening dementia over the past few weeks, just started 8/1 on seroquel. Has become more agitated and less redirectable since this morning.

## 2022-08-09 NOTE — ED PROVIDER NOTE - PATIENT PORTAL LINK FT
You can access the FollowMyHealth Patient Portal offered by [facility] by registering at the following website: [email or web address]. By joining [facility]s FollowMyHealth portal, you will also be able to view your health information using other applications (apps) compatible with our system.

## 2022-08-09 NOTE — ED ADULT NURSE NOTE - OBJECTIVE STATEMENT
a&ox1 BIBA from [facility]; per EMS pt found "wandering outside with abrasions and a bump on his head and looked like fell" EMS states pt has been "having a decline in his dementia lately"   + hematoma posterior scalp, abrasions to both legs/knees/hands and arms  on arrival pt aggressive, non cooperative, disoriented, poor historian, baseline gait disturbance  ccollar in place on arrival, unk LOC. no anticoag use per pt med list  pt offers no complaints, no details to events prior to arrival

## 2022-08-09 NOTE — ED PROVIDER NOTE - NSFOLLOWUPINSTRUCTIONS_ED_ALL_ED_FT
Fall Prevention in the Home, Adult  Falls can cause injuries. They can happen to people of all ages. There are many things you can do to make your home safe and to help prevent falls. Ask for help when making these changes, if needed.  What actions can I take to prevent falls?  General Instructions     Use good lighting in all rooms. Replace any light bulbs that burn out.Turn on the lights when you go into a dark area. Use night-lights.Keep items that you use often in easy-to-reach places. Lower the shelves around your home if necessary.Set up your furniture so you have a clear path. Avoid moving your furniture around.Do not have throw rugs and other things on the floor that can make you trip.Avoid walking on wet floors.If any of your floors are uneven, fix them.Add color or contrast paint or tape to clearly marcelle and help you see:  Any grab bars or handrails.First and last steps of stairways.Where the edge of each step is.If you use a stepladder:  Make sure that it is fully opened. Do not climb a closed stepladder.Make sure that both sides of the stepladder are locked into place.Ask someone to hold the stepladder for you while you use it.If there are any pets around you, be aware of where they are.What can I do in the bathroom?               Keep the floor dry. Clean up any water that spills onto the floor as soon as it happens.Remove soap buildup in the tub or shower regularly.Use non-skid mats or decals on the floor of the tub or shower.Attach bath mats securely with double-sided, non-slip rug tape.If you need to sit down in the shower, use a plastic, non-slip stool.Install grab bars by the toilet and in the tub and shower. Do not use towel bars as grab bars.What can I do in the bedroom?     Make sure that you have a light by your bed that is easy to reach.Do not use any sheets or blankets that are too big for your bed. They should not hang down onto the floor.Have a firm chair that has side arms. You can use this for support while you get dressed.What can I do in the kitchen?     Clean up any spills right away.If you need to reach something above you, use a strong step stool that has a grab bar.Keep electrical cords out of the way.Do not use floor polish or wax that makes floors slippery. If you must use wax, use non-skid floor wax.What can I do with my stairs?     Do not leave any items on the stairs.Make sure that you have a light switch at the top of the stairs and the bottom of the stairs. If you do not have them, ask someone to add them for you.Make sure that there are handrails on both sides of the stairs, and use them. Fix handrails that are broken or loose. Make sure that handrails are as long as the stairways.Install non-slip stair treads on all stairs in your home.Avoid having throw rugs at the top or bottom of the stairs. If you do have throw rugs, attach them to the floor with carpet tape.Choose a carpet that does not hide the edge of the steps on the stairway.Check any carpeting to make sure that it is firmly attached to the stairs. Fix any carpet that is loose or worn.What can I do on the outside of my home?     Use bright outdoor lighting.Regularly fix the edges of walkways and driveways and fix any cracks.Remove anything that might make you trip as you walk through a door, such as a raised step or threshold.Trim any bushes or trees on the path to your home.Regularly check to see if handrails are loose or broken. Make sure that both sides of any steps have handrails.Install guardrails along the edges of any raised decks and porches.Clear walking paths of anything that might make someone trip, such as tools or rocks.Have any leaves, snow, or ice cleared regularly.Use sand or salt on walking paths during winter.Clean up any spills in your garage right away. This includes grease or oil spills.What other actions can I take?     Wear shoes that:  Have a low heel. Do not wear high heels.Have rubber bottoms.Are comfortable and fit you well.Are closed at the toe. Do not wear open-toe sandals.Use tools that help you move around (mobility aids) if they are needed. These include:  Canes.Walkers.Scooters.Crutches.Review your medicines with your doctor. Some medicines can make you feel dizzy. This can increase your chance of falling.Ask your doctor what other things you can do to help prevent falls.  Where to find more information  Centers for Disease Control and Prevention, [place]: https://cdc.govNational [facility] on Aging: https://xr7cbpd.sigrid.nih.govContact a doctor if:  You are afraid of falling at home.You feel weak, drowsy, or dizzy at home.You fall at home.Summary  There are many simple things that you can do to make your home safe and to help prevent falls.Ways to make your home safe include removing tripping hazards and installing grab bars in the bathroom.Ask for help when making these changes in your home.

## 2022-08-09 NOTE — ED PROVIDER NOTE - PHYSICAL EXAMINATION
Gen: agitated and yelling in stretcher, C-collar in place.    Head: normocephalic, ecchymosis on occiput.  ENT: EOMI. No exudates  CV: RRR. Audible S1 and S2. No murmurs. 2+ radial and PT pulses   Pulm: Clear to auscultation bilaterally. No wheezes, rales, or rhonchi  Abd: soft, normoactive BS x4, NTND, no rebound, no guarding  Musculoskeletal:  No peripheral edema, no calf ttp  Skin: Bilateral abrasions to knees, abrasion to right ankle.   Neurologic: AAOx3, no focal deficits  : no CVA tenderness

## 2022-08-09 NOTE — ED ADULT NURSE NOTE - CHIEF COMPLAINT QUOTE
Pt BIBA, pt wandered off of dementia unit at [facility], found lying on sidewalk by staff member, abrasions noted to B/L knees, hematoma noted to occiput, unknown LOC, denies blood thinners, c-collar in place, AOx2 at baseline

## 2022-09-20 ENCOUNTER — APPOINTMENT (OUTPATIENT)
Dept: CARDIOLOGY | Facility: CLINIC | Age: 87
End: 2022-09-20

## 2022-09-27 ENCOUNTER — APPOINTMENT (OUTPATIENT)
Dept: CARDIOLOGY | Facility: CLINIC | Age: 87
End: 2022-09-27

## 2023-04-18 ENCOUNTER — APPOINTMENT (OUTPATIENT)
Dept: CARDIOLOGY | Facility: CLINIC | Age: 88
End: 2023-04-18

## 2024-09-21 ENCOUNTER — INPATIENT (INPATIENT)
Facility: HOSPITAL | Age: 89
LOS: 2 days | Discharge: NURSING, REHAB OR LONG-TERM CARE | DRG: 66 | End: 2024-09-24
Attending: STUDENT IN AN ORGANIZED HEALTH CARE EDUCATION/TRAINING PROGRAM | Admitting: STUDENT IN AN ORGANIZED HEALTH CARE EDUCATION/TRAINING PROGRAM
Payer: MEDICARE

## 2024-09-21 VITALS
TEMPERATURE: 98 F | DIASTOLIC BLOOD PRESSURE: 61 MMHG | WEIGHT: 179.9 LBS | SYSTOLIC BLOOD PRESSURE: 152 MMHG | HEART RATE: 82 BPM | RESPIRATION RATE: 16 BRPM | OXYGEN SATURATION: 98 %

## 2024-09-21 DIAGNOSIS — I62.01 NONTRAUMATIC ACUTE SUBDURAL HEMORRHAGE: ICD-10-CM

## 2024-09-21 PROBLEM — I10 ESSENTIAL (PRIMARY) HYPERTENSION: Chronic | Status: ACTIVE | Noted: 2022-08-09

## 2024-09-21 PROBLEM — I25.10 ATHEROSCLEROTIC HEART DISEASE OF NATIVE CORONARY ARTERY WITHOUT ANGINA PECTORIS: Chronic | Status: ACTIVE | Noted: 2022-08-09

## 2024-09-21 PROBLEM — F03.90 UNSPECIFIED DEMENTIA, UNSPECIFIED SEVERITY, WITHOUT BEHAVIORAL DISTURBANCE, PSYCHOTIC DISTURBANCE, MOOD DISTURBANCE, AND ANXIETY: Chronic | Status: ACTIVE | Noted: 2022-08-09

## 2024-09-21 LAB
ALBUMIN SERPL ELPH-MCNC: 3.6 G/DL — SIGNIFICANT CHANGE UP (ref 3.3–5.2)
ALP SERPL-CCNC: 103 U/L — SIGNIFICANT CHANGE UP (ref 40–120)
ALT FLD-CCNC: 13 U/L — SIGNIFICANT CHANGE UP
ANION GAP SERPL CALC-SCNC: 13 MMOL/L — SIGNIFICANT CHANGE UP (ref 5–17)
APPEARANCE UR: CLEAR — SIGNIFICANT CHANGE UP
APTT BLD: 34.1 SEC — SIGNIFICANT CHANGE UP (ref 24.5–35.6)
AST SERPL-CCNC: 23 U/L — SIGNIFICANT CHANGE UP
BASOPHILS # BLD AUTO: 0.01 K/UL — SIGNIFICANT CHANGE UP (ref 0–0.2)
BASOPHILS NFR BLD AUTO: 0.3 % — SIGNIFICANT CHANGE UP (ref 0–2)
BILIRUB SERPL-MCNC: 2 MG/DL — SIGNIFICANT CHANGE UP (ref 0.4–2)
BILIRUB UR-MCNC: NEGATIVE — SIGNIFICANT CHANGE UP
BUN SERPL-MCNC: 24.2 MG/DL — HIGH (ref 8–20)
CALCIUM SERPL-MCNC: 8.9 MG/DL — SIGNIFICANT CHANGE UP (ref 8.4–10.5)
CHLORIDE SERPL-SCNC: 104 MMOL/L — SIGNIFICANT CHANGE UP (ref 96–108)
CO2 SERPL-SCNC: 24 MMOL/L — SIGNIFICANT CHANGE UP (ref 22–29)
COLOR SPEC: YELLOW — SIGNIFICANT CHANGE UP
CREAT SERPL-MCNC: 1.36 MG/DL — HIGH (ref 0.5–1.3)
DIFF PNL FLD: NEGATIVE — SIGNIFICANT CHANGE UP
EGFR: 47 ML/MIN/1.73M2 — LOW
EOSINOPHIL # BLD AUTO: 0.09 K/UL — SIGNIFICANT CHANGE UP (ref 0–0.5)
EOSINOPHIL NFR BLD AUTO: 2.9 % — SIGNIFICANT CHANGE UP (ref 0–6)
GLUCOSE SERPL-MCNC: 109 MG/DL — HIGH (ref 70–99)
GLUCOSE UR QL: NEGATIVE MG/DL — SIGNIFICANT CHANGE UP
HCT VFR BLD CALC: 31.6 % — LOW (ref 39–50)
HGB BLD-MCNC: 10 G/DL — LOW (ref 13–17)
IMM GRANULOCYTES NFR BLD AUTO: 0.3 % — SIGNIFICANT CHANGE UP (ref 0–0.9)
INR BLD: 1.32 RATIO — HIGH (ref 0.85–1.18)
KETONES UR-MCNC: NEGATIVE MG/DL — SIGNIFICANT CHANGE UP
LEUKOCYTE ESTERASE UR-ACNC: NEGATIVE — SIGNIFICANT CHANGE UP
LYMPHOCYTES # BLD AUTO: 0.42 K/UL — LOW (ref 1–3.3)
LYMPHOCYTES # BLD AUTO: 13.4 % — SIGNIFICANT CHANGE UP (ref 13–44)
MCHC RBC-ENTMCNC: 31.6 GM/DL — LOW (ref 32–36)
MCHC RBC-ENTMCNC: 32.1 PG — SIGNIFICANT CHANGE UP (ref 27–34)
MCV RBC AUTO: 101.3 FL — HIGH (ref 80–100)
MONOCYTES # BLD AUTO: 0.24 K/UL — SIGNIFICANT CHANGE UP (ref 0–0.9)
MONOCYTES NFR BLD AUTO: 7.6 % — SIGNIFICANT CHANGE UP (ref 2–14)
NEUTROPHILS # BLD AUTO: 2.37 K/UL — SIGNIFICANT CHANGE UP (ref 1.8–7.4)
NEUTROPHILS NFR BLD AUTO: 75.5 % — SIGNIFICANT CHANGE UP (ref 43–77)
NITRITE UR-MCNC: NEGATIVE — SIGNIFICANT CHANGE UP
PH UR: 6 — SIGNIFICANT CHANGE UP (ref 5–8)
PLATELET # BLD AUTO: 118 K/UL — LOW (ref 150–400)
POTASSIUM SERPL-MCNC: 3.8 MMOL/L — SIGNIFICANT CHANGE UP (ref 3.5–5.3)
POTASSIUM SERPL-SCNC: 3.8 MMOL/L — SIGNIFICANT CHANGE UP (ref 3.5–5.3)
PROT SERPL-MCNC: 8.2 G/DL — SIGNIFICANT CHANGE UP (ref 6.6–8.7)
PROT UR-MCNC: NEGATIVE MG/DL — SIGNIFICANT CHANGE UP
PROTHROM AB SERPL-ACNC: 14.5 SEC — HIGH (ref 9.5–13)
RBC # BLD: 3.12 M/UL — LOW (ref 4.2–5.8)
RBC # FLD: 16.6 % — HIGH (ref 10.3–14.5)
SODIUM SERPL-SCNC: 141 MMOL/L — SIGNIFICANT CHANGE UP (ref 135–145)
SP GR SPEC: 1.01 — SIGNIFICANT CHANGE UP (ref 1–1.03)
UROBILINOGEN FLD QL: 0.2 MG/DL — SIGNIFICANT CHANGE UP (ref 0.2–1)
WBC # BLD: 3.14 K/UL — LOW (ref 3.8–10.5)
WBC # FLD AUTO: 3.14 K/UL — LOW (ref 3.8–10.5)

## 2024-09-21 PROCEDURE — 99284 EMERGENCY DEPT VISIT MOD MDM: CPT

## 2024-09-21 PROCEDURE — 71045 X-RAY EXAM CHEST 1 VIEW: CPT | Mod: 26

## 2024-09-21 PROCEDURE — 99285 EMERGENCY DEPT VISIT HI MDM: CPT | Mod: 25,GC

## 2024-09-21 PROCEDURE — 70450 CT HEAD/BRAIN W/O DYE: CPT | Mod: 26,MC

## 2024-09-21 PROCEDURE — 72170 X-RAY EXAM OF PELVIS: CPT | Mod: 26

## 2024-09-21 PROCEDURE — 72125 CT NECK SPINE W/O DYE: CPT | Mod: 26,MC

## 2024-09-21 PROCEDURE — G0168: CPT | Mod: GC

## 2024-09-21 RX ORDER — IPRATROPIUM BROMIDE AND ALBUTEROL SULFATE .5; 3 MG/3ML; MG/3ML
3 SOLUTION RESPIRATORY (INHALATION)
Refills: 0 | DISCHARGE

## 2024-09-21 RX ORDER — HYDRALAZINE HYDROCHLORIDE 100 MG/1
1 TABLET ORAL
Refills: 0 | DISCHARGE

## 2024-09-21 RX ORDER — TAMSULOSIN HCL 0.4 MG
1 CAPSULE ORAL
Refills: 0 | DISCHARGE

## 2024-09-21 RX ORDER — TADALAFIL 10 MG/1
1 TABLET, FILM COATED ORAL
Refills: 0 | DISCHARGE

## 2024-09-21 RX ORDER — TAMSULOSIN HCL 0.4 MG
0.4 CAPSULE ORAL AT BEDTIME
Refills: 0 | Status: DISCONTINUED | OUTPATIENT
Start: 2024-09-21 | End: 2024-09-24

## 2024-09-21 RX ORDER — SODIUM CHLORIDE 0.9 % (FLUSH) 0.9 %
1000 SYRINGE (ML) INJECTION
Refills: 0 | Status: DISCONTINUED | OUTPATIENT
Start: 2024-09-21 | End: 2024-09-22

## 2024-09-21 RX ORDER — ALPRAZOLAM 0.5 MG/1
1 TABLET ORAL
Refills: 0 | DISCHARGE

## 2024-09-21 RX ORDER — QUETIAPINE FUMARATE 50 MG/1
25 TABLET, FILM COATED ORAL AT BEDTIME
Refills: 0 | Status: DISCONTINUED | OUTPATIENT
Start: 2024-09-21 | End: 2024-09-22

## 2024-09-21 RX ORDER — FUROSEMIDE 10 MG/ML
1 INJECTION INTRAVENOUS
Refills: 0 | DISCHARGE

## 2024-09-21 RX ORDER — AMLODIPINE BESYLATE 5 MG
1 TABLET ORAL
Refills: 0 | DISCHARGE

## 2024-09-21 RX ORDER — MIRTAZAPINE 30 MG/1
15 TABLET, FILM COATED ORAL AT BEDTIME
Refills: 0 | Status: DISCONTINUED | OUTPATIENT
Start: 2024-09-21 | End: 2024-09-22

## 2024-09-21 RX ORDER — CHLORHEXIDINE GLUCONATE ORAL RINSE 1.2 MG/ML
1 SOLUTION DENTAL
Refills: 0 | Status: DISCONTINUED | OUTPATIENT
Start: 2024-09-21 | End: 2024-09-24

## 2024-09-21 RX ORDER — MIRTAZAPINE 30 MG/1
1 TABLET, FILM COATED ORAL
Refills: 0 | DISCHARGE

## 2024-09-21 RX ORDER — ESCITALOPRAM OXALATE 10 MG
20 TABLET ORAL DAILY
Refills: 0 | Status: DISCONTINUED | OUTPATIENT
Start: 2024-09-21 | End: 2024-09-24

## 2024-09-21 RX ORDER — ATORVASTATIN CALCIUM 10 MG/1
20 TABLET, FILM COATED ORAL AT BEDTIME
Refills: 0 | Status: DISCONTINUED | OUTPATIENT
Start: 2024-09-21 | End: 2024-09-24

## 2024-09-21 RX ORDER — LOSARTAN POTASSIUM 100 MG/1
1 TABLET, FILM COATED ORAL
Refills: 0 | DISCHARGE

## 2024-09-21 RX ORDER — ATORVASTATIN CALCIUM 10 MG/1
1 TABLET, FILM COATED ORAL
Refills: 0 | DISCHARGE

## 2024-09-21 RX ADMIN — Medication 75 MILLILITER(S): at 23:16

## 2024-09-21 RX ADMIN — Medication 0.4 MILLIGRAM(S): at 21:57

## 2024-09-21 RX ADMIN — MIRTAZAPINE 15 MILLIGRAM(S): 30 TABLET, FILM COATED ORAL at 22:05

## 2024-09-21 RX ADMIN — QUETIAPINE FUMARATE 25 MILLIGRAM(S): 50 TABLET, FILM COATED ORAL at 23:16

## 2024-09-21 RX ADMIN — ATORVASTATIN CALCIUM 20 MILLIGRAM(S): 10 TABLET, FILM COATED ORAL at 21:57

## 2024-09-21 NOTE — PATIENT PROFILE ADULT - FALL HARM RISK - HARM RISK INTERVENTIONS

## 2024-09-21 NOTE — ED PROVIDER NOTE - CARE PLAN
Principal Discharge DX:	Laceration of head  Secondary Diagnosis:	Head injury, closed   1 Principal Discharge DX:	Acute on chronic intracranial subdural hematoma  Secondary Diagnosis:	Head injury, closed

## 2024-09-21 NOTE — H&P ADULT - ASSESSMENT
ASSESSMENT: Patient is a 89y old male on ASA 81 mg presented s/p fall of his bed complicated by 4 mm acute/subacute subdural hematoma on the right. GCS 15     PLAN:    - Admit to SICU for Q1 hour neurocheck -Dr Steen   - f/u R CT head  - f/u neurosx recs    - pain control  - DVT ppx: SCDs   - tertiary exam   - Geriatric consult   - PT eval when cleared   - strict I/Os  - Plan discussed with Attending, Dr. Steen

## 2024-09-21 NOTE — ED PROVIDER NOTE - ATTENDING CONTRIBUTION TO CARE
I performed a history and physical exam of the patient and discussed their management with the resident. I reviewed the resident's note and agree with the documented findings and plan of care. My medical decision making and observations are found below.      89-year-old male past medical history of dementia, hypertension, CAD on baby aspirin presenting with right head injury status post mechanical fall from bed.  Found to have acute on chronic right subdural hematoma.  Admitted to SICU for further management.  No other injuries identified at this time.

## 2024-09-21 NOTE — ED PROVIDER NOTE - CLINICAL SUMMARY MEDICAL DECISION MAKING FREE TEXT BOX
89 year old male pt presents to the ED s/p fall, + head strike, no LOC, at baseline mental status. 2 cm lac to frontal scalp, repaired with dermabond and steri strips. No other signs of external trauma. Will obtain CT head and neck, DC home if negative 89 year old male pt presents to the ED s/p fall, + head strike, no LOC, at baseline mental status. 2 cm lac to frontal scalp, repaired with dermabond and steri strips. No other signs of external trauma. Will obtain CT head and neck, DC home if negative    + 4 cm acute on chronic subdural on right side, eval by trauma and neurosurgery, admission for repat scan in 24 hrs. Pt neuro intact, stable at this time

## 2024-09-21 NOTE — H&P ADULT - NS ATTEND AMEND GEN_ALL_CORE FT
Patient is a 89-year-old male with HTN and dementia who presents after falling at home.  Patient states he was getting out of bed and fell.  He endorses head strike without LOC.  He is only on ASA and denies AC use.  On exam, he has a large swelling over his right forehead, but no pain or tenderness anywhere else.  CTH shows a right SDH.  He will require admission, SICU, NSx consultation, Q1H neurochecks, repeat CTH, and CT chest to assess the right lung patchiness.

## 2024-09-21 NOTE — PATIENT PROFILE ADULT - NSPROEXTENSIONSOFSELF_GEN_A_NUR
Please call and advise     TSH is a bit underactive-has she missed any medication?  T4 was normal.   We could continue at same dose and recheck in 6 months   Your electrolytes, kidney function and liver function were normal.     Your cholesterol looks great.   Your A1C shows that your diabetes is well controlled.  Let me know your thoughts about the thyroid   Your vitamin D level is still pending. 
eyeglasses

## 2024-09-21 NOTE — CONSULT NOTE ADULT - NSCONSULTADDITIONALINFOA_GEN_ALL_CORE
NSGY Attg:    see above    patient seen and examined by PA staff    imaging reviewed    agree with exam and plan as above

## 2024-09-21 NOTE — CONSULT NOTE ADULT - SUBJECTIVE AND OBJECTIVE BOX
HPI:  89-year-old male patient with history dementia, hypertension, on ASA81 presents to the ED status post fall.  Patient was attempting to get out of his bed and fell to the ground, hitting right side of head on the floor, witnessed by son. No LOC, no blood thinner use. Pt suffered small laceration to frontal scalp. Denies any current pain or other injury. No further complaints at this time. Neurosurgery consulted for R SDH.     PAST MEDICAL & SURGICAL HISTORY:  HTN (hypertension)  CAD (coronary artery disease)  Dementia    SOCIAL HISTORY:  Tobacco Use:  EtOH use:   Substance:    Allergies  Allergy Status Unknown    REVIEW OF SYSTEMS  Negative except as noted in HPI    HOME MEDICATIONS:  Home Medications:    Vital Signs Last 24 Hrs  T(C): 36.6 (21 Sep 2024 14:49), Max: 36.6 (21 Sep 2024 14:49)  T(F): 97.8 (21 Sep 2024 14:49), Max: 97.8 (21 Sep 2024 14:49)  HR: 82 (21 Sep 2024 14:49) (82 - 82)  BP: 152/61 (21 Sep 2024 14:49) (152/61 - 152/61)  BP(mean): --  RR: 16 (21 Sep 2024 14:49) (16 - 16)  SpO2: 98% (21 Sep 2024 14:49) (98% - 98%)    Parameters below as of 21 Sep 2024 14:49  Patient On (Oxygen Delivery Method): room air    PHYSICAL EXAM:  GENERAL: NAD, well-groomed  HEAD:  Atraumatic, normocephalic  DRAINS:   WOUND: Dressing clean dry intact; well healed  SHUNT: easily compressible and refills  EYES: Conjunctiva and sclera clear; corneal reflex intact  ENMT: No tonsillar erythema, exudates, or enlargement; moist mucous membranes, good dentition, no lesions  NECK: Supple, nontender to palpation  ELISA COMA SCORE: E- V- M- =       E: 4= opens eyes spontaneously 3= to voice 2= to noxious 1= no opening       V: 5= oriented 4= confused 3= inappropriate words 2= incomprehensible sounds 1= nonverbal 1T= intubated       M: 6= follows commands 5= localizes 4= withdraws 3= flexor posturing 2= extensor posturing 1= no movement  MENTAL STATUS: AAO x3; Awake/Comatose; Opens eyes spontaneously/to voice/to light touch/to noxious stimuli; Appropriately conversant without aphasia/Nonverbal; following simple commands/mimicking/not following commands  CRANIAL NERVES: Visual acuity normal for age, visual fields full to confrontation, PERRL. EOMI without nystagmus. Facial sensation intact V1-3 distribution b/l. Face symmetric w/ normal eye closure and smile, tongue midline. Hearing grossly intact. Speech clear. Head turning and shoulder shrug intact.   REFLEXES: PERRL. Corneals intact b/l. Gag intact. Cough intact. Oculocephalic reflex intact (Doll's eye). Negative Hernandez's b/l. Negative clonus b/l  MOTOR: strength 5/5 b/l upper and lower extremities  Uppers     Delt (C5/6)     Bicep (C5/6)     Wrist Extend (C6)     Tricep (C7)     HG (C8/T1)  R                     5/5                 5/5                         5/5                           5/5                   5/5  L                      5/5                 5/5                         5/5                           5/5                   5/5  Lowers      HF(L1/L2)     KE (L3)     DF (L4)     EHL (L5)     PF (S1)      R                     5/5              5/5           5/5           5/5            5/5  L                     5/5               5/5          5/5            5/5            5/5  SENSATION: grossly intact to light touch all extremities  COORDINATION: Gait intact; rapid alternating movements intact b/l upper extremities; no upper extremity dysmetria  MUSCLE STRETCH REFLEXES: DTRs 2+ intact and symmetric  PLANTAR: upgoing/downgoing/mute (Babinski)  CHEST/LUNG: Clear to auscultation bilaterally; no rales, rhonchi, wheezing, or rubs  HEART: +S1/+S2; Regular rate and rhythm; no murmurs, rubs, or gallops  ABDOMEN: Soft, nontender, nondistended; bowel sounds present all four quadrants  EXTREMITIES:  2+ peripheral pulses, no clubbing, cyanosis, or edema  LYMPH: No lymphadenopathy noted  SKIN: Warm, dry; no rashes or lesions    LABS:  All labs reviewed    RADIOLOGY & ADDITIONAL STUDIES:  < from: CT Head No Cont (09.21.24 @ 17:47) >  IMPRESSION:  CT HEAD:  4 mm acute/subacute subdural hematoma on the right. No significant mass   effect or midline shift.    Right frontal scalp hematoma measuring up to approximately 1.7 cm in   depth. No calvarial fracture.    This report was discussed with patient's covering emergency room   physician, at 9/21/2024 6:17 PM.    CT CERVICAL SPINE:  No acute fracture or traumatic subluxation.    Multi-level degenerative changes.    Small left pleural effusion. Nonspecific groundglass opacity in the right   upper lobe, most likely infectious or inflammatory in etiology. HPI:  89-year-old male patient with history dementia, hypertension, on ASA81 presents to the ED status post fall.  Patient was attempting to get out of his bed and fell to the ground, hitting right side of head on the floor, witnessed by son. No LOC. Pt has large hematoma to his frontal scalp. Denies any current pain, other than hematoma or other injury. No further complaints at this time. Neurosurgery consulted for R SDH. Trauma to see patient    PAST MEDICAL & SURGICAL HISTORY:  HTN (hypertension)  CAD (coronary artery disease)  Dementia    Allergies  Allergy Status Unknown    REVIEW OF SYSTEMS  Negative except as noted in HPI    HOME MEDICATIONS:  Home Medications:    Vital Signs Last 24 Hrs  T(C): 36.6 (21 Sep 2024 14:49), Max: 36.6 (21 Sep 2024 14:49)  T(F): 97.8 (21 Sep 2024 14:49), Max: 97.8 (21 Sep 2024 14:49)  HR: 82 (21 Sep 2024 14:49) (82 - 82)  BP: 152/61 (21 Sep 2024 14:49) (152/61 - 152/61)  BP(mean): --  RR: 16 (21 Sep 2024 14:49) (16 - 16)  SpO2: 98% (21 Sep 2024 14:49) (98% - 98%)    Parameters below as of 21 Sep 2024 14:49  Patient On (Oxygen Delivery Method): room air    PHYSICAL EXAM:  GENERAL: NAD, well-groomed  HEAD:  large R frontal hematoma   ELISA COMA SCORE: E-4 V-4 M-6 =14  MENTAL STATUS: AAO x2 (alert to person and situation only) thinks its 2045 and its December; Awake; Opens eyes spontaneously; Appropriately conversant without aphasia; following commands  CRANIAL NERVES: PERRL. EOMI without nystagmus. Facial sensation intact V1-3 distribution b/l. Face symmetric w/ normal eye closure and smile, tongue midline. Hard of hearing. Speech clear. Head turning and shoulder shrug intact.   MOTOR: FC, moving all ext spontaneously, RUE 4+/5, LUE 5/5, BL LE 5/5  SENSATION: grossly intact to light touch all extremities  CHEST/LUNG: nonlabored breathing  ABDOMEN: Soft, nontender, nondistended  EXTREMITIES: ptting edema in BL LE  SKIN: Warm, dry    LABS:  All labs reviewed    RADIOLOGY & ADDITIONAL STUDIES:  < from: CT Head No Cont (09.21.24 @ 17:47) >  IMPRESSION:  CT HEAD:  4 mm acute/subacute subdural hematoma on the right. No significant mass   effect or midline shift.    Right frontal scalp hematoma measuring up to approximately 1.7 cm in   depth. No calvarial fracture.    This report was discussed with patient's covering emergency room   physician, at 9/21/2024 6:17 PM.    CT CERVICAL SPINE:  No acute fracture or traumatic subluxation.    Multi-level degenerative changes.    Small left pleural effusion. Nonspecific groundglass opacity in the right   upper lobe, most likely infectious or inflammatory in etiology.

## 2024-09-21 NOTE — PATIENT PROFILE ADULT - VISION (WITH CORRECTIVE LENSES IF THE PATIENT USUALLY WEARS THEM):
H & P


Stated Complaint: Rib pain/SOB


Time Seen by Provider: 08/30/18 02:49


HPI/ROS: 





64 yo female presents complaining of right sided pleuritic chest pain that 

began this afternoon.  He denies cough, fevers or chills.


He denies any prior hospitalizations or surgeries.


He states he has a prior hx of chronic norco use, has stopped the norco and 

uses Kratom for approx 3 months.


No leg pain, no leg cramps.





Review of systems


As per HPI


General no fever no chills no weakness


HEENT no eye pain no eye discharge. No eye redness, no sore throat


Respiratory no cough, positive shortness of breath


Cardiac positive chest pain, no peripheral edema


GI no abdominal pain, no diarrhea, no constipation, no nausea, no vomiting


  no flank pain, no hematuria, no dysuria


Musculoskeletal no myalgias, no joint pain


Heme  no easy bruising, no easy bleeding


Endo no polyuria, no polydipsia


Skin no rashes, no pruritus


Neuro no syncope, no dizziness, no headaches


Psych is no suicidal ideation, no homicidal ideation





Source: Patient


Exam Limitations: No limitations





- Personal History


Current Tetanus/Diphtheria Vaccine: Yes


Current Tetanus Diphtheria and Acellular Pertussis (TDAP): Yes





- Medical/Surgical History


Hx Asthma: No


Hx Chronic Respiratory Disease: No


Hx Diabetes: No


Hx Cardiac Disease: No


Hx Renal Disease: No


Hx Cirrhosis: No


Hx Alcoholism: No


Hx HIV/AIDS: No


Hx Splenectomy or Spleen Trauma: No


Other PMH: Denies





- Family History


Significant Family History: No pertinent family hx





- Social History


Smoking Status: Former smoker


Alcohol Use: Occasionally


Drug Use: Marijuana, Other (kratom)





- Physical Exam


Exam: 





64 yo M in moderate distress secondary to right lower chest wall pain


at,nc


eomi, anicteric


neck supple, no jvd, no meningismus


lungs cta bilat, no wheeze


chest wall ttp right lower chest wall


heart rrr


abd non dist nabs soft nt


ext no cce


skin no rash


Constitutional: 


 Initial Vital Signs











Temperature (C)  36.6 C   08/30/18 02:38


 


Heart Rate  95   08/30/18 02:38


 


Respiratory Rate  18   08/30/18 02:38


 


Blood Pressure  170/115 H  08/30/18 02:38


 


O2 Sat (%)  93   08/30/18 02:38








 











O2 Delivery Mode               Room Air














Allergies/Adverse Reactions: 


 





varenicline [From Chantix] Allergy (Verified 08/30/18 02:41)


 








Home Medications: 














 Medication  Instructions  Recorded


 


NK [No Known Home Meds]  08/30/18














Medical Decision Making


ED Course/Re-evaluation: 





Patient seen and evaluated for right lower chest pain of approximately 12 hr 

duration.


IV established, labs drawn


EKG normal sinus rhythm, rate 97


Chest x-ray poor inspiration , no infiltrate


Troponin wnl


D-dimer wnl


CBC wnl


CMP wnl





Pt given one liter normal saline, fentanyl 50 mcg and toradol 30 mg with marked


relief of his right sided chest wall pain.





CT chest to rule out pe done because pt mildly tachycardic and had been on long 

drive


>8 hours.





CT chest no pe, pos nodules, emphysematous changes





Imp


right costochondritis





Plan


rest


ibuprofen or acetaminophen


advise follow up with pcp








Differential Diagnosis: 





Differential diagnosis considered but not limited to:


Myocardial infarction, pneumothorax, pulmonary embolus, pleural effusion, 

pleurisy, pneumonia, costochondritis, herpes zoster





- Data Points


Laboratory Results: 


 











  08/30/18 08/30/18





  03:01 02:58


 


POC Sodium  141 mEq/L mEq/L  





   (135-145)  


 


POC Potassium  4.5 mEq/L mEq/L  





   (3.3-5.0)  


 


POC Chloride  100.0 mEq/L mEq/L  





   ()  


 


POC Total CO2  27 mEq/L mEq/L  





   (22-31)  


 


POC BUN  13 mg/dL mg/dL  





   (7-23)  


 


POC Creatinine  1.0 mg/dL mg/dL  





   (0.7-1.3)  


 


POC Glucose  105 mg/dL H mg/dL  





   ()  


 


POC Calcium  10.3 mg/dL mg/dL  





   (8.5-10.4)  


 


POC Total Bilirubin  0.6 mg/dL mg/dL  





   (0.1-1.4)  


 


POC AST  32 IU/L IU/L  





   (17-59)  


 


POC ALT  41 IU/L IU/L  





   (21-72)  


 


POC Alk Phosphatase  81 IU/L IU/L  





   ()  


 


POC Troponin I    0.01 ng/mL ng/mL





    (0.00-0.08) 


 


POC Total Protein  8.4 g/dL H g/dL  





   (6.3-8.2)  


 


POC Albumin  4.2 g/dL g/dL  





   (3.5-5.0)  











Medications Given: 


 








Discontinued Medications





Fentanyl (Sublimaze)  50 mcg IVP EDNOW ONE


   Stop: 08/30/18 02:56


   Last Admin: 08/30/18 02:58 Dose:  50 mcg


Sodium Chloride (Ns)  1,000 mls @ 0 mls/hr IV ONCE ONE


   PRN Reason: Wide Open


   Stop: 08/30/18 03:02


   Last Admin: 08/30/18 03:04 Dose:  1,000 mls


Ketorolac Tromethamine (Toradol)  30 mg IVP EDNOW ONE


   Stop: 08/30/18 03:17


   Last Admin: 08/30/18 03:18 Dose:  30 mg





Point of Care Test Results: 


 CBC











CBC Collection Date            08/30/18


 


CBC Collection Time            02:45


 


WBC                            11.1


 


RBC                            4.84


 


HGB                            15.3


 


HCT                            46.0


 


PLT                            200


 


Neut #                         7.7


 


Neut                           70.0


 


LYMPH #                        2.1


 


LYMPH                          18.7


 


Other WBC #                    1.3


 


Other WBC                      11.3


 


MCV                            95.0











 Chemistry











  08/30/18 08/30/18





  03:01 02:58


 


POC Sodium  141 mEq/L mEq/L  





   (135-145)  


 


POC Potassium  4.5 mEq/L mEq/L  





   (3.3-5.0)  


 


POC Chloride  100.0 mEq/L mEq/L  





   ()  


 


POC Total CO2  27 mEq/L mEq/L  





   (22-31)  


 


POC BUN  13 mg/dL mg/dL  





   (7-23)  


 


POC Creatinine  1.0 mg/dL mg/dL  





   (0.7-1.3)  


 


POC Glucose  105 mg/dL H mg/dL  





   ()  


 


POC Calcium  10.3 mg/dL mg/dL  





   (8.5-10.4)  


 


POC Total Bilirubin  0.6 mg/dL mg/dL  





   (0.1-1.4)  


 


POC AST  32 IU/L IU/L  





   (17-59)  


 


POC ALT  41 IU/L IU/L  





   (21-72)  


 


POC Alk Phosphatase  81 IU/L IU/L  





   ()  


 


POC Troponin I    0.01 ng/mL ng/mL





    (0.00-0.08) 


 


POC Total Protein  8.4 g/dL H g/dL  





   (6.3-8.2)  


 


POC Albumin  4.2 g/dL g/dL  





   (3.5-5.0)  








 D-Dimer











D-Dimer Collection Date        08/30/18


 


D-Dimer Collection Time        02:45


 


D-Dimer (ng/ml)                <100

















Departure





- Departure


Disposition: Home, Routine, Self-Care


Clinical Impression: 


 Pleuritic chest pain, Costochondritis, acute





Condition: Good


Instructions:  Costochondritis (ED)


Additional Instructions: 


Ibuprofen or acetaminophen as needed for pain.


Referrals: 


Patient,NotPresent [Primary Care Provider] - As per Instructions


[facility],. [Clinic] - As per Instructions


[facility] [Provider Group] - As per Instructions
Partially impaired: cannot see medication labels or newsprint, but can see obstacles in path, and the surrounding layout; can count fingers at arm's length

## 2024-09-21 NOTE — ED PROVIDER NOTE - OBJECTIVE STATEMENT
89-year-old male patient with history dementia, hypertension, on aspirin presents to the ED status post fall.  Patient was attempting to get out of his bed and fell to the ground, hitting right side of head on the floor, witnessed by son. No LOC, no blood thinner use. Pt suffered small laceration to frontal scalp. Denies any current pain or other injury. No further complaints at this time.

## 2024-09-21 NOTE — ED PROVIDER NOTE - NS_EDPROVIDERDISPOUSERTYPE_ED_A_ED
[employer] message sent   
Refilled x 3 months. Please advise patient to schedule follow-up for future fills.     
Was the patient seen in the last year in this department? Yes     Does patient have an active prescription for medications requested? No     Received Request Via: Pharmacy      Pt met protocol?: Yes pt last ov 7/17 fills due for next month   Lab Results   Component Value Date/Time    HBA1C 6.5 (H) 06/13/2017 11:28 AM      Glucose   Date Value Ref Range Status   10/02/2017 Negative Negative mg/dL Final   08/12/2017 140 (H) 65 - 99 mg/dL Final            
Attending Attestation (For Attendings USE Only)...

## 2024-09-21 NOTE — ED ADULT NURSE NOTE - CHIEF COMPLAINT QUOTE
from [facility] here for fall out of bed. + head strike. neg LOC. on ASA. hx dementia. bandage to forehead noted. a&ox1.

## 2024-09-21 NOTE — ED ADULT TRIAGE NOTE - GLASGOW COMA SCALE: BEST MOTOR RESPONSE, MLM
(M6) obeys commands Topical Sulfur Applications Pregnancy And Lactation Text: This medication is Pregnancy Category C and has an unknown safety profile during pregnancy. It is unknown if this topical medication is excreted in breast milk.

## 2024-09-21 NOTE — ED PROVIDER NOTE - PHYSICAL EXAMINATION
Const: Pt is well appearing. In no acute distress.   HEENT: TM's clear bilaterally. Oropharynx clear without exudates or erythema. Moist mucous membranes  Eyes: PERRLA. No scleral icterus. EOMI.  Neck:. Soft and supple. Full ROM without pain. No cervical midline tenderness.   Cardiac: Regular rate and regular rhythm. +S1/S2. No murmurs, rubs or gallops. Peripheral pulses 2+ and symmetric. +2 pitting edema to b/l lower extremities   Resp: Speaking in full sentences, breath sounds equal and clear bilaterally. No wheezes, rales or rhonchi.  Abd: Soft, non-tender, non-distended.  No guarding or rebound.  MSK: Spine midline and non-tender.  Skin: 2 cm superficial laceration to right frontal scalp with surrounding hematoma   Neuro: Moves all extremities symmetrically. No motor or sensory deficits

## 2024-09-21 NOTE — ED ADULT NURSE NOTE - NSFALLHARMRISKINTERV_ED_ALL_ED

## 2024-09-21 NOTE — ED PROVIDER NOTE - NSFOLLOWUPINSTRUCTIONS_ED_ALL_ED_FT
Understanding Your Risk for Falls    Each year, millions of people have serious injuries from falls. It is important to understand your risk for falling. Talk with your health care provider about your risk and what you can do to lower it. There are actions you can take at home to lower your risk.    If you do have a serious fall, make sure you tell your health care provider. Falling once raises your risk for falling again.    How can falls affect me?  Serious injuries from falls are common. These include:    Broken bones, such as hip fractures.  Head injuries, such as traumatic brain injuries (TBI).    Fear of falling can also cause you to avoid activities and stay at home. This can make your muscles weaker and actually raise your risk for a fall.    What can increase my risk?  There are a number of risk factors that increase your risk for falling. The more risk factors you have, the higher your risk for falling. Serious injuries from a fall most often happen to people older than age 65. Children and young adults ages 15–29 are also at higher risk.    Common risk factors include:    Weakness in the lower body.  Lack (deficiency) of vitamin D.  Being generally weak or confused due to long-term (chronic) illness.  Dizziness or balance problems.  Poor vision.  Medicines that cause dizziness or drowsiness. These can include medicines for your blood pressure, heart, anxiety, insomnia, or edema, as well as pain medicines and muscle relaxants.    Other risk factors include:    Drinking alcohol.  Having had a fall in the past.  Having depression.  Foot pain or improper footwear.  Working at a dangerous job.  Having any of the following in your home:    Tripping hazards, such as floor clutter or loose rugs.  Poor lighting.  Pets or clutter.  Dementia or memory loss.    What actions can I take to lower my risk of falling?          Physical activity    Maintain physical fitness. Do strength and balance exercises. Consider taking a regular class to build strength and balance. Yoga and stella chi are good options.        Vision    Have your eyes checked every year and your vision prescription updated as needed.        Walking aids and footwear    Wear nonskid shoes. Do not wear high heels.  Do not walk around the house in socks or slippers.  Use a cane or walker as told by your health care provider.        Home safety    Attach secure railings on both sides of your stairs.  Install grab bars for your tub, shower, and toilet. Use a bath mat in your tub or shower.  Use good lighting in all rooms. Keep a flashlight near your bed.  Make sure there is a clear path from your bed to the bathroom. Use night-lights.  Do not use throw rugs. Make sure all carpeting is taped or tacked down securely.  Remove all clutter from walkways and stairways, including extension cords.  Repair uneven or broken steps.  Avoid walking on icy or slippery surfaces. Walk on the grass instead of on icy or slick sidewalks. Where you can, use ice melt to get rid of ice on walkways.  Use a cordless phone.    Questions to ask your health care provider  Can you help me check my risk for a fall?  Do any of my medicines make me more likely to fall?  Should I take a vitamin D supplement?  What exercises can I do to improve my strength and balance?  Should I make an appointment to have my vision checked?  Do I need a bone density test to check for weak bones or osteoporosis?  Would it help to use a cane or a walker?    Where to find more information  Centers for Disease Control and PreventionDALY: www.cdc.gov  Community-Based Fall Prevention Programs: www.cdc.gov  National [place] [employer] Aging: vo7eytt.sigrid.nih.gov    Contact a health care provider if:  You fall at home.  You are afraid of falling at home.  You feel weak, drowsy, or dizzy.    Summary  People 65 and older are at high risk for falling. However, older people are not the only ones injured in falls. Children and young adults have a higher-than-normal risk too.  Talk with your health care provider about your risks for falling and how to lower those risks.  Taking certain precautions at home can lower your risk for falling.  If you fall, always tell your health care provider.

## 2024-09-21 NOTE — H&P ADULT - HISTORY OF PRESENT ILLNESS
HPI: 7-year-old male patient with history dementia, hypertension, on ASA81 presents to the ED status post fall.  Patient was attempting to get out of his bed and fell to the ground, hitting right side of head on the floor, witnessed by son. No LOC, on presentation patient complaining of R scalp hematoma , AAOx4 , GCS 15 , UE and LE motor and sensory intact       " 89-year-old male patient with history dementia, hypertension, on ASA81 presents to the ED status post fall.  Patient was attempting to get out of his bed and fell to the ground, hitting right side of head on the floor, witnessed by son. No LOC. Pt has large hematoma to his frontal scalp. Denies any current pain, other than hematoma or other injury. No further complaints at this time. Neurosurgery consulted for R SDH. "      ROS: 10-system review is otherwise negative except HPI above.      PAST MEDICAL & SURGICAL HISTORY:  HTN (hypertension)      CAD (coronary artery disease)      Dementia        FAMILY HISTORY:    Family history not pertinent as reviewed with the patient.    SOCIAL HISTORY:  Denies any toxic habits    ALLERGIES: NKA Allergy Status Unknown      Home Medications:      --------------------------------------------------------------------------------------------    PHYSICAL EXAM:   General: NAD, Lying in bed comfortably  Neuro: A+Ox3  HEENT: R forehead hematoma with no active bleeding . MMM  Cardio: RRR  Resp: Non labored breathing on RA  GI/Abd: Soft, NT/ND  Vascular:  1+ edema b /l   Pelvis: stable  Musculoskeletal: All 4 extremities moving spontaneously, no limitations, no spinal tenderness.  --------------------------------------------------------------------------------------------    LABS                 10.0   3.14   )----------(  118       ( 21 Sep 2024 18:47 )               31.6      141    |  104    |  24.2   ----------------------------<  109        ( 21 Sep 2024 18:47 )  3.8     |  24.0   |  1.36     Ca    8.9        ( 21 Sep 2024 18:47 )    TPro  8.2    /  Alb  3.6    /  TBili  2.0    /  DBili  x      /  AST  23     /  ALT  13     /  AlkPhos  103    ( 21 Sep 2024 18:47 )    LIVER FUNCTIONS - ( 21 Sep 2024 18:47 )  Alb: 3.6 g/dL / Pro: 8.2 g/dL / ALK PHOS: 103 U/L / ALT: 13 U/L / AST: 23 U/L / GGT: x           PT/INR -  14.5 sec / 1.32 ratio   ( 21 Sep 2024 18:47 )       PTT -  34.1 sec   ( 21 Sep 2024 18:47 )  CAPILLARY BLOOD GLUCOSE        Urinalysis Basic - ( 21 Sep 2024 18:47 )    Color: x / Appearance: x / SG: x / pH: x  Gluc: 109 mg/dL / Ketone: x  / Bili: x / Urobili: x   Blood: x / Protein: x / Nitrite: x   Leuk Esterase: x / RBC: x / WBC x   Sq Epi: x / Non Sq Epi: x / Bacteria: x               HPI: 7-year-old male patient with history dementia, hypertension, on ASA81 presents to the ED status post fall.  Patient was attempting to get out of his bed and fell to the ground, hitting right side of head on the floor, witnessed by son. No LOC, on presentation patient complaining of R scalp hematoma , GCS 15 , UE and LE motor and sensory intact       " 89-year-old male patient with history dementia, hypertension, on ASA81 presents to the ED status post fall.  Patient was attempting to get out of his bed and fell to the ground, hitting right side of head on the floor, witnessed by son. No LOC. Pt has large hematoma to his frontal scalp. Denies any current pain, other than hematoma or other injury. No further complaints at this time. Neurosurgery consulted for R SDH. "      ROS: 10-system review is otherwise negative except HPI above.      PAST MEDICAL & SURGICAL HISTORY:  HTN (hypertension)      CAD (coronary artery disease)      Dementia        FAMILY HISTORY:    Family history not pertinent as reviewed with the patient.    SOCIAL HISTORY:  Denies any toxic habits    ALLERGIES: NKA Allergy Status Unknown      Home Medications:      --------------------------------------------------------------------------------------------    PHYSICAL EXAM:   General: NAD, Lying in bed comfortably  Neuro: GCS 15  HEENT: R forehead hematoma with no active bleeding . MMM  Cardio: RRR  Resp: Non labored breathing on RA  GI/Abd: Soft, NT/ND  Vascular:  1+ edema b /l   Pelvis: stable  Musculoskeletal: All 4 extremities moving spontaneously, no limitations, no spinal tenderness.  --------------------------------------------------------------------------------------------    LABS                 10.0   3.14   )----------(  118       ( 21 Sep 2024 18:47 )               31.6      141    |  104    |  24.2   ----------------------------<  109        ( 21 Sep 2024 18:47 )  3.8     |  24.0   |  1.36     Ca    8.9        ( 21 Sep 2024 18:47 )    TPro  8.2    /  Alb  3.6    /  TBili  2.0    /  DBili  x      /  AST  23     /  ALT  13     /  AlkPhos  103    ( 21 Sep 2024 18:47 )    LIVER FUNCTIONS - ( 21 Sep 2024 18:47 )  Alb: 3.6 g/dL / Pro: 8.2 g/dL / ALK PHOS: 103 U/L / ALT: 13 U/L / AST: 23 U/L / GGT: x           PT/INR -  14.5 sec / 1.32 ratio   ( 21 Sep 2024 18:47 )       PTT -  34.1 sec   ( 21 Sep 2024 18:47 )  CAPILLARY BLOOD GLUCOSE        Urinalysis Basic - ( 21 Sep 2024 18:47 )    Color: x / Appearance: x / SG: x / pH: x  Gluc: 109 mg/dL / Ketone: x  / Bili: x / Urobili: x   Blood: x / Protein: x / Nitrite: x   Leuk Esterase: x / RBC: x / WBC x   Sq Epi: x / Non Sq Epi: x / Bacteria: x

## 2024-09-21 NOTE — GOALS OF CARE CONVERSATION - ADVANCED CARE PLANNING - CONVERSATION DETAILS
Patient's HCP son Min Hills states that he is health care proxy, though makes joint decisions with his brother Renato.  Discussed/defined DNR/DNI w/HCP, including explaining the use of medications/electricity to restart the heart and the use of ETT/mechanical ventilation.  HCP is clear that they WOULD NOT want chest compressions, shocks or medications to restart the heart should it stop beating or he have a rhythm not compatible with life.  HCP is also clear that they WOULD NOT want to be intubated and put on a ventilator should they require it, though were amenable to a trial of NIV.    Patient to be: [X] DNR/DNI w/trial of NIV

## 2024-09-21 NOTE — ED ADULT NURSE NOTE - DRUG PRE-SCREENING (DAST -1)
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 Her PCP palpated a mass in the lower outer left breast in 01/2016. Diagnostic mammogram and ultrasound showed a 3.3 cm mass at 4:30, 8 cm from the nipple. She was also found to have multiple abnormal left axillary lymph nodes. The largest one was 2.4 cm. Biopsy of her left breast mass demonstrated invasive ductal carcinoma, grade 3 with extensive tumor necrosis. Axillary LN biopsy was also stim and the other is called GRA forte.  Positive for malignancy. The tumor cells were strongly positive for estrogen receptor (> 95%) and moderate to strongly positive for progesterone receptor (60-70%). HER2/lizz was non-amplified by FISH.  PET/CT showed the left breast mass, 5 hypermetabolic left axillary lymph nodes, indeterminate left cervical chain lymph nodes, and an indeterminate hypodensity in the dome of the liver. She received 11 cycles of weekly Taxol, the 12th was cancelled due to neuropathy. She received one cycle of ddAC but did not tolerate it due to fatigue and generalized weakness. She declined further chemotherapy.    Left breast lumpectomy and left axillary lymph node dissection was performed by Dr. Amezcua on 6/29/16.  Pathology showed a residual 1.6 cm grade 2, IDC in the left breast.  Surgical margins were negative.  Invasive tumor cellularity of 30%.  Tumor infiltrating lymphocytes were estimated at 50%.  6/19 excised lymph nodes were involved with malignancy.  The largest lymph node metastasis measured 3.9 cm and had a 1 mm area of extranodal extension.  Minimal treatment related changes were noted in the lymph nodes.  Pathologic staging was [id number],  "or stage IIIa.  [facility] residual cancer burden was Class III.  Adjuvant Xeloda was recommended, she declined.  She completed radiation on 10/17/16.  She declined zometa for prevention of bone metastases.  She has been on letrozole since early 09/2016.    Interim History:   Ms. Osorio comes into clinic today for routine breast cancer follow-up.  She is on treatment with letrozole.  Since our last visit, she has started two additional supplements for her thyroid, Marcelo- stim and GRA-forte.  These were prescribed to her by her sister, who is a homeopath.  Since starting these medications, her home thyroid testing through [facility] shows that her thyroid function is returning to normal.  She has also felt much better.  She states at the peak of her thyroid dysfunction, she was unable to walk a half lap [place].  Now she can walk without issues.  In fact, her sister Cecilia recently visited her in July and had her walking every night of the week.  In the last couple months she has lost 7 pounds.  She is very proud of this.  She is watching sugar intake in the diet.  She has not made other dietary changes at this time.      She denies concerning lumps, pain, or swelling of either breast.  With activity such as when she mows the lawn, she will have intermittent left chest wall swelling.  She has no current cough, shortness of breath, or chest pain.  She has no abdominal complaints.  She states that she had COVID \"early on\", as did the rest of her family.  She feels she is now \"good with COVID\".  She has not had any of the vaccinations.  Remainder of a complete 12 point review of systems was reviewed with patient is negative with exception that mentioned above.    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       PAST SURGICAL HISTORY:  Past Surgical History:   Procedure Laterality Date     CL AFF SURGICAL PATHOLOGY  2005    UTERINE LEIOMYOMA  [D25.9]     HC BIOPSY/EXCIS CERVICAL LESION W/WO FULGURATION  08-15-00,12-    endocervical polypectomy     HC TOOTH EXTRACTION W/FORCEP      wisdom teeth     LUMPECTOMY BREAST WITH SENTINEL NODE, COMBINED Left 6/29/2016    Segmental mastectomy with axillary lymph node dissection     REMOVE PORT VASCULAR ACCESS Right 6/29/2016    Procedure: REMOVE PORT VASCULAR ACCESS;  Surgeon: Gianna Amezcua MD;  Location:  OR     MEDS:  Current Outpatient Medications   Medication     Calcium-Magnesium-Vitamin D (CALCIUM MAGNESIUM PO)     fluocinonide (LIDEX) 0.05 % external cream     letrozole (FEMARA) 2.5 MG tablet     melatonin 3 MG CAPS     Multiple Minerals-Vitamins (CALCIUM CITRATE PLUS/MAGNESIUM PO)     order for DME     VITAMIN D PO     No current facility-administered medications for this visit.     Facility-Administered Medications Ordered in Other Visits   Medication     lidocaine BUFFERED 1 % solution 10 mL       ALLERGIES:  Allergies   Allergen Reactions     Benadryl [Diphenhydramine] Other (See Comments)     Pt had severe hypotension, nausea and vasovagal type reaction to IV Benadryl.   Give PO only.      Cats      Keflex [Cephalexin]      rash        PHYSICAL EXAM:  BP (!) 151/91   Pulse 90   Temp 98.8  F (37.1  C) (Oral)   Resp 18   Wt 113.9 kg (251 lb)   LMP 10/28/2009   SpO2 96%   BMI 44.92 kg/m    General:  Obese adult female in NAD.  Alert and oriented.  HEENT:  Normocephalic.  Sclera anicteric.  MMM.  No lesions of the oropharynx.  Lymph:  No palpable cervical, supraclavicular, or axillary LAD.  Chest:  CTA bilaterally.  No wheezes or crackles.  CV:  Tachycardic.  RR.  Statement Selected  Nl S1 and S2.  No m/r/g.  Breast:  Bilateral breasts are large and of normal fibroglandular density.  There are no discretely palpable masses in either breast.  Abd:  Soft/ND.    Ext:  No pitting edema of the bilateral lower extremities.    Musculo:  Full ROM of the bilateral upper extremities.  Neuro:  Cranial nerves grossly intact.  Psych:  Mood and affect appear normal.  Skin:  No visible concerning skin rashes or lesions.    LABS REVIEWED THIS VISIT:  8/22/2022 Labs:  Electrolytes, kidney, and liver function tests are all wnl.  TSH is wnl.  Blood counts are wnl.    8/22/2022 Bilateral screening mammograms:  We reviewed the images together in clinic today.  Breasts with scattered fibroglandular densities.  There are no new or concerning findings when compared to mammogram one year prior.  Radiology read is pending.    IMPRESSION/PLAN: Ms. Osorio is a 65 year old female with a h/o stage IIIa, K2uL6S7, ER/CT positive, HER2 negative left breast cancer. She is s/p 11 weeks of weekly taxol, 1 cycle of ddAC, left breast lumpectomy, ALND, and adjuvant radiation.  She has been on letrozole since early September, 2016.      1.  Stage III breast cancer:  Remedios is 6 years, 2 months out from excision of a left breast cancer.  She is on treatment with letrozole.  She is tolerating the medication well.  We plan to continue letrozole to complete a total 10 years of endocrine therapy (through 09/2026).      She is asymptomatic of disease recurrence on history today and clinical exam is without concerning findings.  I personally reviewed the images of bilateral screening mammograms performed today which are without significant change when compared to one year prior.  I will see her back in 6 months for her next visit.     2.  Bone Health:  DEXA in 02/2021 with a lowest T-score of -1.2 in the distal radius c/w osteopenia.  She is s/p treatment with 2 years Zometa from 09/2019 - 10/2021, she did not tolerate the medication with  significant joint pains following each dose.  Recommend she take calcium 1200 mg, vitamin D 1000 international unit(s), and 20-30 minutes of daily weight bearing activity.  Will repeat a DEXA in 02/2023.      3.  Morbid obesity:  Patients with increased BMI have worse breast cancer outcomes and losing weight can improve these outcomes.  I am happy to see that she has lost 7 pounds in the last 3 months.  I encouraged her to continue to go for walks daily and reduce carbohydrate intake in the diet. She has previously declined referral to the weight management clinic.    4.  Hypothyroidism:  She declines treatment with levothyroxine and instead has been taking Marcelo-Stim and GRA-Forte supplements advised by her sister who is a homeopath.  TSH checked today is wnl.  She also has less symptoms of hypothyroidism than at prior visits.    5.  Follow Up:  Return to clinic in approximately 6 months for labs, DEXA bone density scan and visit with me.      30 minutes spent on the date of the encounter doing chart review, review of test results, interpretation of tests, patient visit and documentation

## 2024-09-21 NOTE — ED ADULT NURSE NOTE - OBJECTIVE STATEMENT
Pt is a 89YOM who is here for a fall, pt states that he was in bed when he got up "super fast" and he lost his balance, falling out of bed and hitting his head on the floor, pt states he did not lose consciousness, no dizziness, nausea, or vomiting reported. Pt is A&O4, denies any other complaints at this time.

## 2024-09-21 NOTE — CONSULT NOTE ADULT - ASSESSMENT
89M with fall oob -LOC with imaging showing traumatic R SDH 4mm in size    Recommendations:  -Trauma eval  -Q1 hour neurochecks in setting of ASA use until repeat 6 hr CTH showing stability. IF 6HR REPEAT CTH is stable, pt can be Q2 neurochecks  -CTH also should be performed at 24 hours. CTCspine negative  -HOB 30 degrees  -SBP<160  -NPO  -Hold ASA until cleared by neurosurgeon, SCD only for DVT ppx    Case discussed with Dr. Brewer

## 2024-09-21 NOTE — ED ADULT TRIAGE NOTE - CHIEF COMPLAINT QUOTE
from [facility] here for fall out of bed. + head strike. neg LOC. on ASA. hx dementia. bandage to forehead noted. a&ox1.

## 2024-09-22 LAB
ANION GAP SERPL CALC-SCNC: 10 MMOL/L — SIGNIFICANT CHANGE UP (ref 5–17)
ANION GAP SERPL CALC-SCNC: 8 MMOL/L — SIGNIFICANT CHANGE UP (ref 5–17)
APPEARANCE UR: ABNORMAL
APTT BLD: 33.4 SEC — SIGNIFICANT CHANGE UP (ref 24.5–35.6)
BACTERIA # UR AUTO: NEGATIVE /HPF — SIGNIFICANT CHANGE UP
BASOPHILS # BLD AUTO: 0.01 K/UL — SIGNIFICANT CHANGE UP (ref 0–0.2)
BASOPHILS NFR BLD AUTO: 0.5 % — SIGNIFICANT CHANGE UP (ref 0–2)
BILIRUB UR-MCNC: ABNORMAL
BUN SERPL-MCNC: 21.9 MG/DL — HIGH (ref 8–20)
BUN SERPL-MCNC: 23.3 MG/DL — HIGH (ref 8–20)
CALCIUM SERPL-MCNC: 8.1 MG/DL — LOW (ref 8.4–10.5)
CALCIUM SERPL-MCNC: 8.5 MG/DL — SIGNIFICANT CHANGE UP (ref 8.4–10.5)
CAST: 1 /LPF — SIGNIFICANT CHANGE UP (ref 0–4)
CHLORIDE SERPL-SCNC: 105 MMOL/L — SIGNIFICANT CHANGE UP (ref 96–108)
CHLORIDE SERPL-SCNC: 106 MMOL/L — SIGNIFICANT CHANGE UP (ref 96–108)
CO2 SERPL-SCNC: 26 MMOL/L — SIGNIFICANT CHANGE UP (ref 22–29)
CO2 SERPL-SCNC: 26 MMOL/L — SIGNIFICANT CHANGE UP (ref 22–29)
COLOR SPEC: ABNORMAL
CREAT SERPL-MCNC: 1.25 MG/DL — SIGNIFICANT CHANGE UP (ref 0.5–1.3)
CREAT SERPL-MCNC: 1.28 MG/DL — SIGNIFICANT CHANGE UP (ref 0.5–1.3)
DIFF PNL FLD: ABNORMAL
EGFR: 51 ML/MIN/1.73M2 — LOW
EGFR: 52 ML/MIN/1.73M2 — LOW
EOSINOPHIL # BLD AUTO: 0.05 K/UL — SIGNIFICANT CHANGE UP (ref 0–0.5)
EOSINOPHIL NFR BLD AUTO: 2.5 % — SIGNIFICANT CHANGE UP (ref 0–6)
GLUCOSE SERPL-MCNC: 101 MG/DL — HIGH (ref 70–99)
GLUCOSE SERPL-MCNC: 104 MG/DL — HIGH (ref 70–99)
GLUCOSE UR QL: NEGATIVE MG/DL — SIGNIFICANT CHANGE UP
HCT VFR BLD CALC: 27.1 % — LOW (ref 39–50)
HCT VFR BLD CALC: 28.8 % — LOW (ref 39–50)
HGB BLD-MCNC: 8.4 G/DL — LOW (ref 13–17)
HGB BLD-MCNC: 9.1 G/DL — LOW (ref 13–17)
IMM GRANULOCYTES NFR BLD AUTO: 0.5 % — SIGNIFICANT CHANGE UP (ref 0–0.9)
INR BLD: 1.52 RATIO — HIGH (ref 0.85–1.18)
KETONES UR-MCNC: NEGATIVE MG/DL — SIGNIFICANT CHANGE UP
LEUKOCYTE ESTERASE UR-ACNC: ABNORMAL
LYMPHOCYTES # BLD AUTO: 0.33 K/UL — LOW (ref 1–3.3)
LYMPHOCYTES # BLD AUTO: 16.6 % — SIGNIFICANT CHANGE UP (ref 13–44)
MAGNESIUM SERPL-MCNC: 1.9 MG/DL — SIGNIFICANT CHANGE UP (ref 1.6–2.6)
MAGNESIUM SERPL-MCNC: 2.5 MG/DL — SIGNIFICANT CHANGE UP (ref 1.6–2.6)
MCHC RBC-ENTMCNC: 31 GM/DL — LOW (ref 32–36)
MCHC RBC-ENTMCNC: 31.6 GM/DL — LOW (ref 32–36)
MCHC RBC-ENTMCNC: 31.9 PG — SIGNIFICANT CHANGE UP (ref 27–34)
MCHC RBC-ENTMCNC: 32.6 PG — SIGNIFICANT CHANGE UP (ref 27–34)
MCV RBC AUTO: 103 FL — HIGH (ref 80–100)
MCV RBC AUTO: 103.2 FL — HIGH (ref 80–100)
MONOCYTES # BLD AUTO: 0.27 K/UL — SIGNIFICANT CHANGE UP (ref 0–0.9)
MONOCYTES NFR BLD AUTO: 13.6 % — SIGNIFICANT CHANGE UP (ref 2–14)
MRSA PCR RESULT.: SIGNIFICANT CHANGE UP
NEUTROPHILS # BLD AUTO: 1.32 K/UL — LOW (ref 1.8–7.4)
NEUTROPHILS NFR BLD AUTO: 66.3 % — SIGNIFICANT CHANGE UP (ref 43–77)
NITRITE UR-MCNC: POSITIVE
NT-PROBNP SERPL-SCNC: 1648 PG/ML — HIGH (ref 0–300)
PH UR: 5 — SIGNIFICANT CHANGE UP (ref 5–8)
PHOSPHATE SERPL-MCNC: 3.2 MG/DL — SIGNIFICANT CHANGE UP (ref 2.4–4.7)
PHOSPHATE SERPL-MCNC: 3.3 MG/DL — SIGNIFICANT CHANGE UP (ref 2.4–4.7)
PLATELET # BLD AUTO: 101 K/UL — LOW (ref 150–400)
PLATELET # BLD AUTO: 101 K/UL — LOW (ref 150–400)
POTASSIUM SERPL-MCNC: 3.9 MMOL/L — SIGNIFICANT CHANGE UP (ref 3.5–5.3)
POTASSIUM SERPL-MCNC: 4 MMOL/L — SIGNIFICANT CHANGE UP (ref 3.5–5.3)
POTASSIUM SERPL-SCNC: 3.9 MMOL/L — SIGNIFICANT CHANGE UP (ref 3.5–5.3)
POTASSIUM SERPL-SCNC: 4 MMOL/L — SIGNIFICANT CHANGE UP (ref 3.5–5.3)
PROCALCITONIN SERPL-MCNC: 0.07 NG/ML — SIGNIFICANT CHANGE UP (ref 0.02–0.1)
PROT UR-MCNC: 100 MG/DL
PROTHROM AB SERPL-ACNC: 16.7 SEC — HIGH (ref 9.5–13)
RBC # BLD: 2.63 M/UL — LOW (ref 4.2–5.8)
RBC # BLD: 2.79 M/UL — LOW (ref 4.2–5.8)
RBC # FLD: 16.6 % — HIGH (ref 10.3–14.5)
RBC # FLD: 16.6 % — HIGH (ref 10.3–14.5)
RBC CASTS # UR COMP ASSIST: >1900 /HPF — HIGH (ref 0–4)
S AUREUS DNA NOSE QL NAA+PROBE: DETECTED
SODIUM SERPL-SCNC: 140 MMOL/L — SIGNIFICANT CHANGE UP (ref 135–145)
SODIUM SERPL-SCNC: 141 MMOL/L — SIGNIFICANT CHANGE UP (ref 135–145)
SP GR SPEC: >1.03 — HIGH (ref 1–1.03)
SQUAMOUS # UR AUTO: 4 /HPF — SIGNIFICANT CHANGE UP (ref 0–5)
UROBILINOGEN FLD QL: 1 MG/DL — SIGNIFICANT CHANGE UP (ref 0.2–1)
WBC # BLD: 1.97 K/UL — LOW (ref 3.8–10.5)
WBC # BLD: 1.99 K/UL — LOW (ref 3.8–10.5)
WBC # FLD AUTO: 1.97 K/UL — LOW (ref 3.8–10.5)
WBC # FLD AUTO: 1.99 K/UL — LOW (ref 3.8–10.5)
WBC UR QL: 7 /HPF — HIGH (ref 0–5)

## 2024-09-22 PROCEDURE — 71260 CT THORAX DX C+: CPT | Mod: 26

## 2024-09-22 PROCEDURE — 99232 SBSQ HOSP IP/OBS MODERATE 35: CPT

## 2024-09-22 PROCEDURE — 70450 CT HEAD/BRAIN W/O DYE: CPT | Mod: 26,77

## 2024-09-22 PROCEDURE — 70450 CT HEAD/BRAIN W/O DYE: CPT | Mod: 26

## 2024-09-22 RX ORDER — FUROSEMIDE 10 MG/ML
40 INJECTION INTRAVENOUS DAILY
Refills: 0 | Status: DISCONTINUED | OUTPATIENT
Start: 2024-09-22 | End: 2024-09-24

## 2024-09-22 RX ORDER — MAGNESIUM SULFATE 500 MG/ML
2 VIAL (ML) INJECTION ONCE
Refills: 0 | Status: COMPLETED | OUTPATIENT
Start: 2024-09-22 | End: 2024-09-22

## 2024-09-22 RX ORDER — AMLODIPINE BESYLATE 5 MG
10 TABLET ORAL EVERY 24 HOURS
Refills: 0 | Status: DISCONTINUED | OUTPATIENT
Start: 2024-09-22 | End: 2024-09-24

## 2024-09-22 RX ORDER — ENOXAPARIN SODIUM 150 MG/ML
30 INJECTION SUBCUTANEOUS EVERY 12 HOURS
Refills: 0 | Status: DISCONTINUED | OUTPATIENT
Start: 2024-09-23 | End: 2024-09-24

## 2024-09-22 RX ORDER — ACETAMINOPHEN 325 MG
1000 TABLET ORAL EVERY 6 HOURS
Refills: 0 | Status: DISCONTINUED | OUTPATIENT
Start: 2024-09-22 | End: 2024-09-24

## 2024-09-22 RX ADMIN — Medication 0.4 MILLIGRAM(S): at 21:15

## 2024-09-22 RX ADMIN — CHLORHEXIDINE GLUCONATE ORAL RINSE 1 APPLICATION(S): 1.2 SOLUTION DENTAL at 06:23

## 2024-09-22 RX ADMIN — Medication 20 MILLIGRAM(S): at 12:17

## 2024-09-22 RX ADMIN — Medication 10 MILLIGRAM(S): at 12:17

## 2024-09-22 RX ADMIN — Medication 10 MILLIEQUIVALENT(S): at 06:23

## 2024-09-22 RX ADMIN — Medication 17 GRAM(S): at 12:17

## 2024-09-22 RX ADMIN — ATORVASTATIN CALCIUM 20 MILLIGRAM(S): 10 TABLET, FILM COATED ORAL at 21:15

## 2024-09-22 RX ADMIN — Medication 250 MILLIGRAM(S): at 23:15

## 2024-09-22 RX ADMIN — Medication 25 GRAM(S): at 06:23

## 2024-09-22 NOTE — DISCHARGE NOTE PROVIDER - HOSPITAL COURSE
Patient is a 89 y/o male admitted after a fall on aspirin sustaining a subdural hematoma. He was admitted to the SICU under the trauma service. Neurosx consulted. Repeat CTH stable. Neurosx recommended pt hold aspirin and follow-up in 1 week. Pt downgraded from SICU to floor on 9/23. Seen by PT/OT. Started on abx for UTI - will complete total 7d course PO upon discharge. Patient is tolerating diet, pain controlled. Will return to [facility] w/ instructions to f/u with neurosx after discharge. Stable for discharge today.    Patient is advised to RETURN TO THE EMERGENCY DEPARTMENT for any of the following - worsening pain, fever/chills, nausea/vomiting, altered mental status, chest pain, shortness of breath, or any other new / worsening symptom.

## 2024-09-22 NOTE — PROGRESS NOTE ADULT - ASSESSMENT
89-year-old male with hx of dementia and HTN s/p fall with R traumatic subdural hemorrhage     INTERVAL EVENTS:  9/21-22: No acute events. Resting comfortable. For 24 hr stability scan     NEURO: Alert & Oriented x1, moving all extremities and intermittently commands   #dementia and agitation: will hold home meds given intermittent somnolence   #SDH:   - q2 neurochecks   - hold dvt ppx   - repeat 24 hr CT head     CARDIO: paced rhythm, palpable distal pulses, no peripheral edema or JVD  #Hyperlipidemia: c/w statin.      PULM: symmetric and unlabored respirations. On RA   #bilateral pulmonary opacitites and effusions   - resume home meds   - check procal   - monitor off abx now     GI: Abdomen soft nontender nondistended  Nutrition: Tolerating regular diet well.    RENAL: Appears euvolemic  - Maintain savage   #BPH: c/w tamsulosin     HEME: No pallor, jaundice, or ecchymosis.   #At risk for DVT: SCD   #anemia: unclear etiology. trend.     ID:  #leukopenia, pancytopenia:   - afebrile   - trend   - check procal   - unclear etiology   - consider heme consult     ENDO:  No striae, and skin changes.  - Maintain euglycemia  #Hypothyroidism: c/w synthroid      SKIN: Skin intact, no rashes, or ulcers     LINES/DRAINS/AIRWAY:   - PIV     GOALS OF CARE / FAMILY DISCUSSION / CODE STATUS: DNR  / DNI     DISPOSITION: Surgical floor pending repeat CT head

## 2024-09-22 NOTE — PROGRESS NOTE ADULT - SUBJECTIVE AND OBJECTIVE BOX
Interval events and physical exam included with assessment and plan below    Objective   T(C): 36.4 (09-22-24 @ 11:44), Max: 37.3 (09-21-24 @ 21:52)  HR: 64 (09-22-24 @ 14:00) (61 - 77)  BP: 136/80 (09-22-24 @ 14:00) (105/55 - 152/71)  RR: 19 (09-22-24 @ 14:00) (8 - 24)  SpO2: 99% (09-22-24 @ 14:00) (89% - 99%)      09-21-24 @ 07:01  -  09-22-24 @ 07:00  --------------------------------------------------------  IN: 915 mL / OUT: 500 mL / NET: 415 mL        HOME MEDS:  ALPRAZolam 0.25 mg oral tablet: 1 tab(s) orally once a day  amLODIPine 10 mg oral tablet: 1 tab(s) orally once a day  atorvastatin 20 mg oral tablet: 1 tab(s) orally once a day  escitalopram 20 mg oral tablet: 1 tab(s) orally once a day  hydrALAZINE 10 mg oral tablet: 1 tab(s) orally once a day  ipratropium-albuterol 0.5 mg-2.5 mg/3 mL inhalation solution: 3 milliliter(s) by nebulizer once a day  Lasix 40 mg oral tablet: 1 tab(s) orally once a day  levothyroxine 100 mcg (0.1 mg) oral tablet: 1 tab(s) orally once a day  losartan 50 mg oral tablet: 1 tab(s) orally once a day  mirtazapine 15 mg oral tablet: 1 tab(s) orally once a day  tadalafil 5 mg oral tablet: 1 tab(s) orally once a day  tamsulosin 0.4 mg oral capsule: 1 cap(s) orally once a day      CURRENT MEDS:   acetaminophen   IVPB .. 1000 milliGRAM(s) IV Intermittent every 6 hours PRN  amLODIPine   Tablet 10 milliGRAM(s) Oral every 24 hours  atorvastatin 20 milliGRAM(s) Oral at bedtime  chlorhexidine 2% Cloths 1 Application(s) Topical <User Schedule>  escitalopram 20 milliGRAM(s) Oral daily  furosemide    Tablet 40 milliGRAM(s) Oral daily  levothyroxine 88 MICROGram(s) Oral daily  polyethylene glycol 3350 17 Gram(s) Oral every 24 hours  tamsulosin 0.4 milliGRAM(s) Oral at bedtime

## 2024-09-22 NOTE — DISCHARGE NOTE PROVIDER - CARE PROVIDER_API CALL
Juan Brewer  Neurosurgery  [address] [place] [id number]  Phone: [phone]  Fax: [phone]  Follow Up Time: 1 week

## 2024-09-22 NOTE — PROGRESS NOTE ADULT - SUBJECTIVE AND OBJECTIVE BOX
HPI: 89-year-old male patient with history dementia, hypertension, on ASA81 presents to the ED status post fall.  Patient was attempting to get out of his bed and fell to the ground, hitting right side of head on the floor, witnessed by son. No LOC. Pt has large hematoma to his frontal scalp. Denies any current pain, other than hematoma or other injury. No further complaints at this time. Neurosurgery consulted for R SDH.    INTERVAL HPI/OVERNIGHT EVENTS:  89y Male s/p seen lying comfortably in bed. Voiding. No acute complaints at this time.    Vital Signs Last 24 Hrs  T(C): 36.5 (22 Sep 2024 07:35), Max: 37.3 (21 Sep 2024 21:52)  T(F): 97.7 (22 Sep 2024 07:35), Max: 99.1 (21 Sep 2024 21:52)  HR: 65 (22 Sep 2024 08:00) (64 - 82)  BP: 113/58 (22 Sep 2024 08:00) (106/53 - 152/71)  BP(mean): 72 (22 Sep 2024 08:00) (70 - 104)  RR: 20 (22 Sep 2024 08:00) (13 - 24)  SpO2: 98% (22 Sep 2024 08:00) (89% - 98%)    Parameters below as of 22 Sep 2024 08:00  Patient On (Oxygen Delivery Method): nasal cannula  O2 Flow (L/min): 2    PHYSICAL EXAM:  GENERAL: NAD, well-groomed  HEAD:  Atraumatic, normocephalic  ELISA COMA SCORE: E- V- M- = 15  MENTAL STATUS: AAO x3; Awake; Opens eyes spontaneously; Appropriately conversant without aphasia; following simple commands  CRANIAL NERVES: PERRL. EOMI without nystagmus. Face symmetric w/ normal eye closure and smile, tongue midline. Hearing grossly intact. Speech clear. Head turning and shoulder shrug intact.   MOTOR: strength 5/5 b/l upper and lower extremities  EXTREMITIES: no clubbing, cyanosis, or edema  SKIN: Warm, dry    LABS:                        8.4    1.99  )-----------( 101      ( 22 Sep 2024 04:07 )             27.1     09-22    140  |  106  |  21.9[H]  ----------------------------<  101[H]  4.0   |  26.0  |  1.25    Ca    8.5      22 Sep 2024 09:28  Phos  3.3     09-22  Mg     2.5     09-22    TPro  8.2  /  Alb  3.6  /  TBili  2.0  /  DBili  x   /  AST  23  /  ALT  13  /  AlkPhos  103  09-21    PT/INR - ( 22 Sep 2024 04:07 )   PT: 16.7 sec;   INR: 1.52 ratio         PTT - ( 22 Sep 2024 04:07 )  PTT:33.4 sec  Urinalysis Basic - ( 22 Sep 2024 09:28 )    Color: x / Appearance: x / SG: x / pH: x  Gluc: 101 mg/dL / Ketone: x  / Bili: x / Urobili: x   Blood: x / Protein: x / Nitrite: x   Leuk Esterase: x / RBC: x / WBC x   Sq Epi: x / Non Sq Epi: x / Bacteria: x        09-21 @ 07:01  -  09-22 @ 07:00  --------------------------------------------------------  IN: 915 mL / OUT: 500 mL / NET: 415 mL        RADIOLOGY & ADDITIONAL TESTS:    < from: CT Head No Cont (09.22.24 @ 01:08) >    IMPRESSION:  As compared to the prior exam from 9/21/2024 at approximately 5:30 PM,   there is grossly stable to slightly improved appearance of acute subdural   hematoma overlying the right frontotemporal lobes. No new sites of   intracranial hemorrhage or new mass effect identified.    Stable size/morphology of the ventricular system.

## 2024-09-22 NOTE — DISCHARGE NOTE PROVIDER - NSDCMRMEDTOKEN_GEN_ALL_CORE_FT
ALPRAZolam 0.25 mg oral tablet: 1 tab(s) orally once a day  amLODIPine 10 mg oral tablet: 1 tab(s) orally once a day  atorvastatin 20 mg oral tablet: 1 tab(s) orally once a day  escitalopram 20 mg oral tablet: 1 tab(s) orally once a day  hydrALAZINE 10 mg oral tablet: 1 tab(s) orally once a day  ipratropium-albuterol 0.5 mg-2.5 mg/3 mL inhalation solution: 3 milliliter(s) by nebulizer once a day  Lasix 40 mg oral tablet: 1 tab(s) orally once a day  levothyroxine 100 mcg (0.1 mg) oral tablet: 1 tab(s) orally once a day  losartan 50 mg oral tablet: 1 tab(s) orally once a day  mirtazapine 15 mg oral tablet: 1 tab(s) orally once a day  tadalafil 5 mg oral tablet: 1 tab(s) orally once a day  tamsulosin 0.4 mg oral capsule: 1 cap(s) orally once a day   acetaminophen 325 mg oral tablet: 3 tab(s) orally every 6 hours As needed Mild Pain (1 - 3)  ALPRAZolam 0.25 mg oral tablet: 1 tab(s) orally once a day  amLODIPine 10 mg oral tablet: 1 tab(s) orally once a day  atorvastatin 20 mg oral tablet: 1 tab(s) orally once a day  escitalopram 20 mg oral tablet: 1 tab(s) orally once a day  hydrALAZINE 10 mg oral tablet: 1 tab(s) orally once a day  ipratropium-albuterol 0.5 mg-2.5 mg/3 mL inhalation solution: 3 milliliter(s) by nebulizer once a day  Lasix 40 mg oral tablet: 1 tab(s) orally once a day  levothyroxine 100 mcg (0.1 mg) oral tablet: 1 tab(s) orally once a day  losartan 50 mg oral tablet: 1 tab(s) orally once a day  mirtazapine 15 mg oral tablet: 1 tab(s) orally once a day  polyethylene glycol 3350 oral powder for reconstitution: 17 gram(s) orally every 24 hours  sulfamethoxazole-trimethoprim 800 mg-160 mg oral tablet: 1 tab(s) orally 2 times a day END 9/29/24  tadalafil 5 mg oral tablet: 1 tab(s) orally once a day  tamsulosin 0.4 mg oral capsule: 1 cap(s) orally once a day

## 2024-09-22 NOTE — DISCHARGE NOTE PROVIDER - NSDCCPCAREPLAN_GEN_ALL_CORE_FT
PRINCIPAL DISCHARGE DIAGNOSIS  Diagnosis: Acute on chronic intracranial subdural hematoma  Assessment and Plan of Treatment:       SECONDARY DISCHARGE DIAGNOSES  Diagnosis: Head injury, closed  Assessment and Plan of Treatment:     Diagnosis: Hepatic cyst  Assessment and Plan of Treatment: Sometimes during the process of diagnosis and treatment for one disorder, a second problem or abnormality is found. We call this an incidental finding. An incidental finding is not related to why you came into the hospital, but is something that should be followed up on after you leave the hospital.  During your care, the following incidental finding was identified and discussed with you or your surrogate:  Seen on: CT Chest  This finding should be followed up by: Primary Care Provider    Diagnosis: Pleural effusion  Assessment and Plan of Treatment: Sometimes during the process of diagnosis and treatment for one disorder, a second problem or abnormality is found. We call this an incidental finding. An incidental finding is not related to why you came into the hospital, but is something that should be followed up on after you leave the hospital.  During your care, the following incidental finding was identified and discussed with you or your surrogate:  Seen on: CT Chest  This finding should be followed up by: Primary Care Provider    Diagnosis: Cholelithiasis  Assessment and Plan of Treatment: Sometimes during the process of diagnosis and treatment for one disorder, a second problem or abnormality is found. We call this an incidental finding. An incidental finding is not related to why you came into the hospital, but is something that should be followed up on after you leave the hospital.  During your care, the following incidental finding was identified and discussed with you or your surrogate:  Seen on: CT Chest  This finding should be followed up by: Primary Care Provider     PRINCIPAL DISCHARGE DIAGNOSIS  Diagnosis: Acute on chronic intracranial subdural hematoma  Assessment and Plan of Treatment: - FOLLOW UP: Please call and make an appointment to see neurosurgeon Dr. Brewer 1 week after discharge. Also, please call and make an appointment with your primary care physician as per your usual schedule.   - ACTIVITY: May return to normal activities as tolerated.  - DIET: May continue regular diet.  - MEDICATIONS: Please resume all home medications as previously prescribed EXCEPT Aspirin. Do not take Aspirin or any other antiplatelet / anticoagulants or NSAIDs. You can take tylenol for pain relief, as needed.  Patient is advised to RETURN TO THE EMERGENCY DEPARTMENT for any of the following - worsening pain, fever/chills, nausea/vomiting, altered mental status, chest pain, shortness of breath, or any other new / worsening symptom.      SECONDARY DISCHARGE DIAGNOSES  Diagnosis: Acute UTI  Assessment and Plan of Treatment: Continue oral antibitiotics (Bactrim) until 9/29/24. Follow-up with your primary care provider, after discharge.    Diagnosis: Hepatic cyst  Assessment and Plan of Treatment: Sometimes during the process of diagnosis and treatment for one disorder, a second problem or abnormality is found. We call this an incidental finding. An incidental finding is not related to why you came into the hospital, but is something that should be followed up on after you leave the hospital.  During your care, the following incidental finding was identified and discussed with you or your surrogate:  Seen on: CT Chest  This finding should be followed up by: Primary Care Provider    Diagnosis: Pleural effusion  Assessment and Plan of Treatment: Sometimes during the process of diagnosis and treatment for one disorder, a second problem or abnormality is found. We call this an incidental finding. An incidental finding is not related to why you came into the hospital, but is something that should be followed up on after you leave the hospital.  During your care, the following incidental finding was identified and discussed with you or your surrogate:  Seen on: CT Chest  This finding should be followed up by: Primary Care Provider    Diagnosis: Cholelithiasis  Assessment and Plan of Treatment: Sometimes during the process of diagnosis and treatment for one disorder, a second problem or abnormality is found. We call this an incidental finding. An incidental finding is not related to why you came into the hospital, but is something that should be followed up on after you leave the hospital.  During your care, the following incidental finding was identified and discussed with you or your surrogate:  Seen on: CT Chest  This finding should be followed up by: Primary Care Provider

## 2024-09-23 LAB
A1C WITH ESTIMATED AVERAGE GLUCOSE RESULT: 5.3 % — SIGNIFICANT CHANGE UP (ref 4–5.6)
ANION GAP SERPL CALC-SCNC: 10 MMOL/L — SIGNIFICANT CHANGE UP (ref 5–17)
ANISOCYTOSIS BLD QL: SLIGHT — SIGNIFICANT CHANGE UP
BASOPHILS # BLD AUTO: 0 K/UL — SIGNIFICANT CHANGE UP (ref 0–0.2)
BASOPHILS NFR BLD AUTO: 0 % — SIGNIFICANT CHANGE UP (ref 0–2)
BUN SERPL-MCNC: 23.5 MG/DL — HIGH (ref 8–20)
CALCIUM SERPL-MCNC: 7.8 MG/DL — LOW (ref 8.4–10.5)
CHLORIDE SERPL-SCNC: 108 MMOL/L — SIGNIFICANT CHANGE UP (ref 96–108)
CHOLEST SERPL-MCNC: 61 MG/DL — SIGNIFICANT CHANGE UP
CO2 SERPL-SCNC: 22 MMOL/L — SIGNIFICANT CHANGE UP (ref 22–29)
CREAT SERPL-MCNC: 1.24 MG/DL — SIGNIFICANT CHANGE UP (ref 0.5–1.3)
EGFR: 53 ML/MIN/1.73M2 — LOW
ELLIPTOCYTES BLD QL SMEAR: SLIGHT — SIGNIFICANT CHANGE UP
EOSINOPHIL # BLD AUTO: 0.05 K/UL — SIGNIFICANT CHANGE UP (ref 0–0.5)
EOSINOPHIL NFR BLD AUTO: 1.8 % — SIGNIFICANT CHANGE UP (ref 0–6)
ESTIMATED AVERAGE GLUCOSE: 105 MG/DL — SIGNIFICANT CHANGE UP (ref 68–114)
GIANT PLATELETS BLD QL SMEAR: PRESENT — SIGNIFICANT CHANGE UP
GLUCOSE SERPL-MCNC: 103 MG/DL — HIGH (ref 70–99)
HCT VFR BLD CALC: 27.9 % — LOW (ref 39–50)
HDLC SERPL-MCNC: 35 MG/DL — LOW
HGB BLD-MCNC: 8.7 G/DL — LOW (ref 13–17)
HYPOCHROMIA BLD QL: SLIGHT — SIGNIFICANT CHANGE UP
LIPID PNL WITH DIRECT LDL SERPL: 18 MG/DL — SIGNIFICANT CHANGE UP
LYMPHOCYTES # BLD AUTO: 0.39 K/UL — LOW (ref 1–3.3)
LYMPHOCYTES # BLD AUTO: 14.2 % — SIGNIFICANT CHANGE UP (ref 13–44)
MAGNESIUM SERPL-MCNC: 2.2 MG/DL — SIGNIFICANT CHANGE UP (ref 1.6–2.6)
MANUAL SMEAR VERIFICATION: SIGNIFICANT CHANGE UP
MCHC RBC-ENTMCNC: 31.2 GM/DL — LOW (ref 32–36)
MCHC RBC-ENTMCNC: 32 PG — SIGNIFICANT CHANGE UP (ref 27–34)
MCV RBC AUTO: 102.6 FL — HIGH (ref 80–100)
MICROCYTES BLD QL: SLIGHT — SIGNIFICANT CHANGE UP
MONOCYTES # BLD AUTO: 0.12 K/UL — SIGNIFICANT CHANGE UP (ref 0–0.9)
MONOCYTES NFR BLD AUTO: 4.4 % — SIGNIFICANT CHANGE UP (ref 2–14)
NEUTROPHILS # BLD AUTO: 2.2 K/UL — SIGNIFICANT CHANGE UP (ref 1.8–7.4)
NEUTROPHILS NFR BLD AUTO: 79.6 % — HIGH (ref 43–77)
NON HDL CHOLESTEROL: 26 MG/DL — SIGNIFICANT CHANGE UP
NT-PROBNP SERPL-SCNC: 1893 PG/ML — HIGH (ref 0–300)
OVALOCYTES BLD QL SMEAR: SLIGHT — SIGNIFICANT CHANGE UP
PHOSPHATE SERPL-MCNC: 3.1 MG/DL — SIGNIFICANT CHANGE UP (ref 2.4–4.7)
PLAT MORPH BLD: NORMAL — SIGNIFICANT CHANGE UP
PLATELET # BLD AUTO: 114 K/UL — LOW (ref 150–400)
POIKILOCYTOSIS BLD QL AUTO: SLIGHT — SIGNIFICANT CHANGE UP
POLYCHROMASIA BLD QL SMEAR: SLIGHT — SIGNIFICANT CHANGE UP
POTASSIUM SERPL-MCNC: 4 MMOL/L — SIGNIFICANT CHANGE UP (ref 3.5–5.3)
POTASSIUM SERPL-SCNC: 4 MMOL/L — SIGNIFICANT CHANGE UP (ref 3.5–5.3)
PROCALCITONIN SERPL-MCNC: 0.07 NG/ML — SIGNIFICANT CHANGE UP (ref 0.02–0.1)
RBC # BLD: 2.72 M/UL — LOW (ref 4.2–5.8)
RBC # FLD: 16.5 % — HIGH (ref 10.3–14.5)
RBC BLD AUTO: ABNORMAL
SMUDGE CELLS # BLD: PRESENT — SIGNIFICANT CHANGE UP
SODIUM SERPL-SCNC: 140 MMOL/L — SIGNIFICANT CHANGE UP (ref 135–145)
T3 SERPL-MCNC: 65 NG/DL — LOW (ref 80–200)
T4 AB SER-ACNC: 4.6 UG/DL — SIGNIFICANT CHANGE UP (ref 4.5–12)
TRIGL SERPL-MCNC: 38 MG/DL — SIGNIFICANT CHANGE UP
TSH SERPL-MCNC: 2.27 UIU/ML — SIGNIFICANT CHANGE UP (ref 0.27–4.2)
VIT B12 SERPL-MCNC: 579 PG/ML — SIGNIFICANT CHANGE UP (ref 232–1245)
WBC # BLD: 2.77 K/UL — LOW (ref 3.8–10.5)
WBC # FLD AUTO: 2.77 K/UL — LOW (ref 3.8–10.5)

## 2024-09-23 PROCEDURE — 99232 SBSQ HOSP IP/OBS MODERATE 35: CPT

## 2024-09-23 PROCEDURE — 71045 X-RAY EXAM CHEST 1 VIEW: CPT | Mod: 26

## 2024-09-23 PROCEDURE — 99223 1ST HOSP IP/OBS HIGH 75: CPT

## 2024-09-23 RX ORDER — CEFTRIAXONE SODIUM 1 G
VIAL (EA) INJECTION
Refills: 0 | Status: DISCONTINUED | OUTPATIENT
Start: 2024-09-23 | End: 2024-09-23

## 2024-09-23 RX ORDER — CEFTRIAXONE SODIUM 1 G
1000 VIAL (EA) INJECTION EVERY 24 HOURS
Refills: 0 | Status: DISCONTINUED | OUTPATIENT
Start: 2024-09-23 | End: 2024-09-24

## 2024-09-23 RX ORDER — HALOPERIDOL LACTATE 2 MG/ML
2.5 CONCENTRATE, ORAL ORAL ONCE
Refills: 0 | Status: COMPLETED | OUTPATIENT
Start: 2024-09-23 | End: 2024-09-23

## 2024-09-23 RX ADMIN — Medication 17 GRAM(S): at 11:21

## 2024-09-23 RX ADMIN — FUROSEMIDE 40 MILLIGRAM(S): 10 INJECTION INTRAVENOUS at 05:14

## 2024-09-23 RX ADMIN — ENOXAPARIN SODIUM 30 MILLIGRAM(S): 150 INJECTION SUBCUTANEOUS at 17:30

## 2024-09-23 RX ADMIN — Medication 2.5 MILLIGRAM(S): at 14:38

## 2024-09-23 RX ADMIN — Medication 88 MICROGRAM(S): at 05:14

## 2024-09-23 RX ADMIN — ENOXAPARIN SODIUM 30 MILLIGRAM(S): 150 INJECTION SUBCUTANEOUS at 05:14

## 2024-09-23 RX ADMIN — Medication 20 MILLIGRAM(S): at 11:21

## 2024-09-23 RX ADMIN — CHLORHEXIDINE GLUCONATE ORAL RINSE 1 APPLICATION(S): 1.2 SOLUTION DENTAL at 05:11

## 2024-09-23 RX ADMIN — Medication 1000 MILLIGRAM(S): at 14:40

## 2024-09-23 RX ADMIN — ATORVASTATIN CALCIUM 20 MILLIGRAM(S): 10 TABLET, FILM COATED ORAL at 21:31

## 2024-09-23 RX ADMIN — Medication 0.4 MILLIGRAM(S): at 21:31

## 2024-09-23 RX ADMIN — Medication 10 MILLIGRAM(S): at 11:21

## 2024-09-23 NOTE — PROGRESS NOTE ADULT - ASSESSMENT
89 year old gentleman w/hx of dementia, HTN who was admitted 9/21 after a fall to floor when getting out of bed and sustained a R traumatic SDH and a large R frontotemporal hematoma    Neuro:   Cardiovascular:   Pulmonary: On 3L NC  Heme: Ongoing pancytopenia  GI:   :   ID:   Fluids:   Endocrine/Electrolytes:   Skin:   Lines: PIV  Code Status: DNR/DNI  Tertiary done  Geriatric eval  Downgrade from critical care setting    Patient seen and examined during multi-disciplinary rounds from 0700 to 1200.   89 year old gentleman w/hx of dementia, HTN who was admitted 9/21 after a fall to floor when getting out of bed and sustained a R traumatic SDH and a large R frontotemporal hematoma.  +UTI.    Neuro:  Conversant but w/signs of dementia -confused - at baseline per team, pain currently controlled w/current regimen   Cardiovascular: Remains HD stable  Pulmonary: On 3L NC -started overnight -now able to wean to off, OOB to chair with assistance, CXR improved after diuresis w/lasix, unclear etiology of infiltrates noted on index scan - which to some degree improved with diuresis - but may have some underlying lung pathology -follow imaging for now  Heme: Ongoing pancytopenia - would follow trend - may need ultimate outpatient hematology work up, H/H stable, scds, lovenox 30 bid (plts are >100K)  GI: Diet as tolerated, bowel regimen  : Voiding, Cr essentially stable, adequate urine output  ID: Afebrile, WBC at 2.77 -slightly up from yesterday, now noted to have procal of 0.07 making infection less likely, started on ceftriaxone given positive UTI, cultures pending  Fluids: None  Endocrine/Electrolytes: Lytes adequate, synthroid in place  Skin:   Lines: PIV  Code Status: DNR/DNI  Tertiary done  Geriatric eval  OK for downgrade from critical care setting at this time.    Patient seen and examined during multi-disciplinary rounds from 0700 to 1200.   89 year old gentleman w/hx of dementia, HTN who was admitted 9/21 after a fall to floor when getting out of bed and sustained a R traumatic SDH and a large R frontotemporal hematoma.  +UTI.    Neuro:  Conversant but w/signs of dementia -confused - at baseline per team, pain currently controlled w/current regimen   Cardiovascular: Remains HD stable  Pulmonary: On 3L NC -started overnight -now able to wean to off, OOB to chair with assistance, CXR improved after diuresis w/lasix, unclear etiology of infiltrates noted on index scan - which to some degree improved with diuresis - but may have some underlying lung pathology -follow imaging for now  Heme: Ongoing pancytopenia - would follow trend - may need ultimate outpatient hematology work up, H/H stable, scds, lovenox 30 bid (plts are >100K)  GI: Diet as tolerated, bowel regimen  : Voiding, Cr essentially stable, adequate urine output  ID: Afebrile, WBC at 2.77 -slightly up from yesterday, now noted to have procal of 0.07 making infection less likely, started on ceftriaxone given positive UTI, cultures pending  Fluids: None  Endocrine/Electrolytes: Lytes adequate, synthroid in place  Skin:   Lines: PIV  Code Status: DNR/DNI w/trial of NIV  Tertiary done  Geriatric eval-pending  OK for downgrade from critical care setting at this time.    Patient seen and examined during multi-disciplinary rounds from 0700 to 1200.

## 2024-09-23 NOTE — PHYSICAL THERAPY INITIAL EVALUATION ADULT - SENSORY TESTS
(+) eye tracking bilaterally in all directions; (+) acuity in all fields; (+) finger to thumb coordination BUEs WFL

## 2024-09-23 NOTE — OCCUPATIONAL THERAPY INITIAL EVALUATION ADULT - ADDITIONAL COMMENTS
Pt is A&O x1, not a reliable historian. Per [facility] assessment, pt lives at [facility]. Unclear if pt received assist for ambulation/ADLs PTA or if he was functionally independent.

## 2024-09-23 NOTE — OCCUPATIONAL THERAPY INITIAL EVALUATION ADULT - DIAGNOSIS, OT EVAL
89 year old Male with hx of dementia, hypertension, on ASA81 presents to the ED s/p fall. Pt has large hematoma to his frontal scalp, CTH reveals R SDH.

## 2024-09-23 NOTE — CHART NOTE - NSCHARTNOTESELECT_GEN_ALL_CORE
SICU Downgrade Note
Nutrition Services
Tertiary Survey
Transfer Accept Note
medical necessity of IV contrast/Event Note

## 2024-09-23 NOTE — CHART NOTE - NSCHARTNOTEFT_GEN_A_CORE
Consult received for MST Score = />2. Upon chart review, pt does not currently meet criteria for protein calorie malnutrition risk per MST. Aware pt downgraded from ICU, awaiting transfer to floor bed. RD to follow up with full nutrition assessment per medical floor protocol.
Pt okay to have contrast for CT chest. Benefit of visualization on CT outweighs the harm of the contrast. Pt will receive IV fluids post-contrast administration.
SICU TRANSFER NOTE  -----------------------------  ICU Admission Date: 9/21  Transfer Date: 09-23-24 @ 15:01    Admission Diagnosis: SDH    Active Problems/injuries: SDH    Procedures: XXXXX INCLUDE DATES    Consultants:  [ ] Cardiology  [ ] Endocrine  [ ] Infectious Disease  [ ] Medicine  [x ]Neurosurgery  [ ] Ortho       [ ] Weight Bearing Status:  [ ] Palliative       [ ] Advanced Directives:    [ ] Physical Medicine and Rehab       [ ] Disposition :   [ ] Plastics  [ ] Pulmonary    Medications  acetaminophen   IVPB .. 1000 milliGRAM(s) IV Intermittent every 6 hours PRN  amLODIPine   Tablet 10 milliGRAM(s) Oral every 24 hours  atorvastatin 20 milliGRAM(s) Oral at bedtime  cefTRIAXone Injectable. 1000 milliGRAM(s) IV Push every 24 hours  chlorhexidine 2% Cloths 1 Application(s) Topical <User Schedule>  enoxaparin Injectable 30 milliGRAM(s) SubCutaneous every 12 hours  escitalopram 20 milliGRAM(s) Oral daily  furosemide    Tablet 40 milliGRAM(s) Oral daily  levothyroxine 88 MICROGram(s) Oral daily  polyethylene glycol 3350 17 Gram(s) Oral every 24 hours  tamsulosin 0.4 milliGRAM(s) Oral at bedtime      89M with SDH s/p fall with encephalohematoma    - q4 neuro checks CT head stable appriciate NS recs  - CXR stable  - Continue home meds  - Cystitis, continue ceftriaxone until 9/26  - local wound care for hematoma / abrasion   - Geriatric eval  - Appreciate GOC DNR/trial of NIV  -
Tertiary Trauma Survey (TTS)    Date of TTS: 09-22-24 @ 12:24                             Admit Date: 09-21-24 @ 21:03      Trauma Activation:    List Injuries Identified to Date:  R SDH  Right frontal scalp hematoma     List Operative and Interventional Radiological Procedures:       Tertiary [x]  Geriatric Consult [x] Pending recs  PTSD [N/A]   GOC [x]   CODE STATUS: DNR/DNI - Trial of NIV     Physical Exam:    Neuro: A and Ox1-2, NAD, Nonfocal. Upper and Lower Extremities Sensory/Motor intact B/L.    HEENT: Normal cephalic, atraumatic. Trachea midline.     Pulm/Chest: equal rise and fall of the chest.    Cardiac: NSR, S1/S2.    GI / Abdomen: Nontender, nondistended.     Musculoskeletal / Extremities: Atraumatic. AROM of the extremities.    Integumentary: R sided temporal hematoma w/ lac - sutures in place      RADIOLOGICAL FINDINGS REVIEW:  CT HEAD:  4 mm acute/subacute subdural hematoma on the right. No significant mass effect or midline shift.    Right frontal scalp hematoma measuring up to approximately 1.7 cm in depth. No calvarial fracture.    CTH   IMPRESSION:  As compared to the prior exam from 9/21/2024 at approximately 5:30 PM, there is grossly stable to slightly improved appearance of acute subdural hematoma overlying the right frontotemporal lobes. No new sites of intracranial hemorrhage or new mass effect identified.    Stable size/morphology of the ventricular system.    CXR   IMPRESSION: Multilobar pneumonia versus CHF.    INCIDENTAL FINDINGS:    [x] Yes, Findings are:  -Small left pleural effusion. Nonspecific groundglass opacity in the right upper lobe, most likely infectious or inflammatory in etiology.  -Cholelithiasis.  - Hepatic cysts.    Findings discussed w/ son ([place]), incidentals in discharge paperwork.      [x] Tertiary exam complete, there are no new injuries identified
SICU TRANSFER NOTE  -----------------------------  ICU Admission Date: 9-23-24  Transfer Date: 09-23-24 @ 15:57    Admission Diagnosis: R subdural hemorrhage    Active Problems/injuries: R subdural hemorrhage, pulmonary edema, UTI    Procedures: None    Consultants:  [ ] Cardiology  [ ] Endocrine  [ ] Infectious Disease  [X] Medicine  [X] Neurosurgery  [ ] Ortho       [ ] Weight Bearing Status:  [ ] Palliative       [ ] Advanced Directives:    [X] Physical Medicine and Rehab       [ ] Disposition: Assisted Living  [ ] Plastics  [ ] Pulmonary    Medications  acetaminophen   IVPB .. 1000 milliGRAM(s) IV Intermittent every 6 hours PRN  amLODIPine   Tablet 10 milliGRAM(s) Oral every 24 hours  atorvastatin 20 milliGRAM(s) Oral at bedtime  cefTRIAXone Injectable. 1000 milliGRAM(s) IV Push every 24 hours  chlorhexidine 2% Cloths 1 Application(s) Topical <User Schedule>  enoxaparin Injectable 30 milliGRAM(s) SubCutaneous every 12 hours  escitalopram 20 milliGRAM(s) Oral daily  furosemide    Tablet 40 milliGRAM(s) Oral daily  levothyroxine 88 MICROGram(s) Oral daily  polyethylene glycol 3350 17 Gram(s) Oral every 24 hours  tamsulosin 0.4 milliGRAM(s) Oral at bedtime      [X] I attest I have reviewed and reconciled all medications prior to transfer      Antibiotics:  cefTRIAXone Injectable. 1000 milliGRAM(s) IV Push every 24 hours    Indication: UTI End Date: 9/26      ASSESSMENT:     PLAN:    Neuro:   #Dementia  #Delirium  - Delirium precautions  - Optimize sleep/wake cycle     CV:   #HTN  - Maintain MAP > 65  - Continue hemodynamic monitoring  - Orthostatics    Pulm:   #Pulmonary HTN  - Maintain sPO2 >92%  - Wean FiO2  - CXR 9/23 improved from admission after Lasix  - Pulmonary toilet, IS, OOBTC    GI/Nutrition:   - Regular diet  - Bowel regimen  - Monitor bowel movements    /Renal:   #Elevated Cre, unknown baseline  - Monitor kidney fxn  - Monitor UOP  - Replete electrolytes PRN (Mg >2, Phos >3, K >4)  - Voiding spontaneously    ID:  #UTI  #Pancytopenia/leukopenia  - Monitor fever curve  - Monitor for leukocytosis  - S/p 1 x cipro, switched to ceftriaxone x 3 days    Endo:  #Hypothyroid  - Home synthroid  - Monitor blood glucose  - Maintain euglycemia, 140-180    Heme/DVT Prophylaxis:  #Pancytopenia/anemia, thrombocytopenia  - Lovenox 30 BID  - SCDs  - Monitor H/H    Skin:  - Large R frontotemporal hematoma s/p lac repair  - Repositioning for DTI prevention while in bed    Lines:  - Peripheral IVs    Dispo:  - Downgrade to floor  - PT recs    Tertiary [X]  Geriatric Consult [X]  PTSD [N/A]   GOC [X]   CODE STATUS: DNR/DNI w/trial of NIV    I have discussed this case with Trauma Floor Team upon transfer and all questions regarding ICU course were answered.  The following items are to be followed up:  1) Orthostatics  2) Case management for dispo to facility

## 2024-09-23 NOTE — PHYSICAL THERAPY INITIAL EVALUATION ADULT - GENERAL OBSERVATIONS, REHAB EVAL
Pt received supine in bed + telemetry//BP + O2 3Lnc + Venous Compression Boots. Pt c/o 0/10 pain, pt agreeable to PT

## 2024-09-23 NOTE — OCCUPATIONAL THERAPY INITIAL EVALUATION ADULT - NSOTDISCHREC_GEN_A_CORE
Return to [facility] with assist for all ADLs/mobility (transfers only) If facility unable to provide needed assist, would recommend [facility]

## 2024-09-23 NOTE — PHYSICAL THERAPY INITIAL EVALUATION ADULT - ADDITIONAL COMMENTS
Pt is A&O x1, not a reliable historian. Per [facility] assessment, pt lives at [facility]. Unclear if pt received assist for ambulation/ADLs PTA or if he was functionally independent.

## 2024-09-23 NOTE — PHYSICAL THERAPY INITIAL EVALUATION ADULT - PERTINENT HX OF CURRENT PROBLEM, REHAB EVAL
89-year-old male patient with history dementia, hypertension, on ASA81 presents to the ED status post fall. Pt has large hematoma to his frontal scalp, CTH reveals right SDH.

## 2024-09-23 NOTE — OCCUPATIONAL THERAPY INITIAL EVALUATION ADULT - GENERAL OBSERVATIONS, REHAB EVAL
Left pt as received supine in bed, NAD, +IV, +Tele//BP, +3LO2NC, +b/l VCB, alarm in place. Pt c/o 0/10 pre and post pain. Pt agreeable to OT evaluation

## 2024-09-23 NOTE — CONSULT NOTE ADULT - SUBJECTIVE AND OBJECTIVE BOX
CONSULT NOTE    VIRAJ HURT  [id number]  89yMale    Patient is a 89y old  Male who presents with a chief complaint of SDH (23 Sep 2024 10:08)    HPI: 7-year-old male patient with history dementia, hypertension, on ASA81 presents to the ED status post fall.  Patient was attempting to get out of his bed and fell to the ground, hitting right side of head on the floor, witnessed by son. No LOC, on presentation patient complaining of R scalp hematoma , GCS 15 , UE and LE motor and sensory intact       " 89-year-old male patient with history dementia, hypertension, on ASA81 presents to the ED status post fall.  Patient was attempting to get out of his bed and fell to the ground, hitting right side of head on the floor, witnessed by son. No LOC. Pt has large hematoma to his frontal scalp. Denies any current pain, other than hematoma or other injury. No further complaints at this time. Neurosurgery consulted for R SDH. "        PAST MEDICAL & SURGICAL HISTORY:  HTN (hypertension)  CAD (coronary artery disease)  Dementia  Hypothyroidism  BPH  Anxiety  CHF  Pulmonary Hypertension    TAVR  Allergies: Allergy Status Unknown      Home Medications:  - ALPRAZolam 0.25 mg oral tablet: 1 tab(s) orally once a day (21 Sep 2024 21:07)  - amLODIPine 10 mg oral tablet: 1 tab(s) orally once a day (21 Sep 2024 21:07)  - atorvastatin 20 mg oral tablet: 1 tab(s) orally once a day (21 Sep 2024 21:07)  - escitalopram 20 mg oral tablet: 1 tab(s) orally once a day (21 Sep 2024 21:07)  - hydrALAZINE 10 mg oral tablet: 1 tab(s) orally once a day (21 Sep 2024 21:07) twice daily   ipratropium-albuterol 0.5 mg-2.5 mg/3 mL inhalation solution: 3 milliliter(s) by nebulizer once a day (21 Sep 2024 21:07)  - Lasix 40 mg oral tablet: 1 tab(s) orally once a day (21 Sep 2024 21:07)  - levothyroxine 100 mcg (0.1 mg) oral tablet: 1 tab(s) orally once a day (21 Sep 2024 21:07)  - losartan 50 mg oral tablet: 1 tab(s) orally once a day (21 Sep 2024 21:07)  - mirtazapine 15 mg oral tablet: 1 tab(s) orally once a day (21 Sep 2024 21:07)  - tadalafil 5 mg oral tablet: 1 tab(s) orally once a day (21 Sep 2024 21:07)  - tamsulosin 0.4 mg oral capsule: 1 cap(s) orally once a day (21 Sep 2024 21:07)  - KCL 20mEQ daily    FAMILY HISTORY:      Social History:      ROS:    MEDICATIONS  (STANDING):  amLODIPine   Tablet 10 milliGRAM(s) Oral every 24 hours  atorvastatin 20 milliGRAM(s) Oral at bedtime  cefTRIAXone Injectable. 1000 milliGRAM(s) IV Push every 24 hours  chlorhexidine 2% Cloths 1 Application(s) Topical <User Schedule>  enoxaparin Injectable 30 milliGRAM(s) SubCutaneous every 12 hours  escitalopram 20 milliGRAM(s) Oral daily  furosemide    Tablet 40 milliGRAM(s) Oral daily  haloperidol    Injectable 2.5 milliGRAM(s) IV Push once  levothyroxine 88 MICROGram(s) Oral daily  polyethylene glycol 3350 17 Gram(s) Oral every 24 hours  tamsulosin 0.4 milliGRAM(s) Oral at bedtime      MEDICATIONS  (PRN):  acetaminophen   IVPB .. 1000 milliGRAM(s) IV Intermittent every 6 hours PRN Mild Pain (1 - 3), Moderate Pain (4 - 6), Severe Pain (7 - 10)      OBJECTIVE:  Vital Signs Last 24 Hrs  T(C): 36.7 (23 Sep 2024 07:31), Max: 37.6 (22 Sep 2024 23:58)  T(F): 98 (23 Sep 2024 07:31), Max: 99.7 (22 Sep 2024 23:58)  HR: 67 (23 Sep 2024 09:00) (59 - 82)  BP: 137/66 (23 Sep 2024 08:00) (100/77 - 141/65)  BP(mean): 85 (23 Sep 2024 08:00) (55 - 102)  RR: 21 (23 Sep 2024 09:00) (12 - 27)  SpO2: 96% (23 Sep 2024 09:00) (92% - 99%)    Parameters below as of 23 Sep 2024 09:00  Patient On (Oxygen Delivery Method): nasal cannula  O2 Flow (L/min): 2      Orthostatic VS      Daily     Daily Weight in k.7 (23 Sep 2024 01:55)    I&O's Summary    22 Sep 2024 07:01  -  23 Sep 2024 07:00  --------------------------------------------------------  IN: 0 mL / OUT: 625 mL / NET: -625 mL    23 Sep 2024 07:01  -  23 Sep 2024 13:13  --------------------------------------------------------  IN: 0 mL / OUT: 400 mL / NET: -400 mL        PHYSICAL EXAMINATION  General:   HEENT:    NECK:    CVS:   RESP:    GI:    :   MSK:    CNS:    INTEG:    PSYCH:      MONITOR:  CAPILLARY BLOOD GLUCOSE                                8.7    2.77  )-----------( 114      ( 23 Sep 2024 02:15 )             27.9     PT/INR - ( 22 Sep 2024 04:07 )   PT: 16.7 sec;   INR: 1.52 ratio         PTT - ( 22 Sep 2024 04:07 )  PTT:33.4 sec      140  |  108  |  23.5[H]  ----------------------------<  103[H]  4.0   |  22.0  |  1.24    Ca    7.8[L]      23 Sep 2024 02:15  Phos  3.1       Mg     2.2         TPro  8.2  /  Alb  3.6  /  TBili  2.0  /  DBili  x   /  AST  23  /  ALT  13  /  AlkPhos  103          Urinalysis Basic - ( 23 Sep 2024 02:15 )    Color: x / Appearance: x / SG: x / pH: x  Gluc: 103 mg/dL / Ketone: x  / Bili: x / Urobili: x   Blood: x / Protein: x / Nitrite: x   Leuk Esterase: x / RBC: x / WBC x   Sq Epi: x / Non Sq Epi: x / Bacteria: x            TTE:    < from: TTE W or WO Ultrasound Enhancing Agent (24 @ 13:35) >  CONCLUSIONS:      1. Left ventricular cavity is normal in size. Left ventricular wall thickness is mildly increased. Left ventricular systolic function is normal with an ejection fraction of 65 % by Arroyo's method of disks.   2. Mild left ventricular hypertrophy.   3. The left ventricular diastolic function is indeterminate.   4. Mildly enlarged right ventricular cavity size, with normal wall thickness, and mildly reduced right ventricular systolic function.   5. Left atrium is severely dilated.   6. The right atrium is severely dilated.   7. Device lead is visualized in the right heart.   8. Structurally normal mitral valve with normal leaflet excursion.   9. Mild to moderate mitral regurgitation.  10. A Transcatheter deployed (TAVR) valve replacement is present in the aortic position The prosthetic valve is well seated. No intravalvular regurgitation Normal valve gradients. DOI 0.38, peak gradient 16 mm Hg, no AI seen.  11. Structurally normal tricuspid valve with normal leaflet excursion. Moderate tricuspid regurgitation.  12. Dilated pulmonary artery.  13. Estimated pulmonary artery systolic pressure is 40 mmHg, consistent with mild pulmonary hypertension.  14. No pericardial effusion seen.    < end of copied text >      RADIOLOGY    < from: CT Cervical Spine No Cont (24 @ 17:47) >  IMPRESSION:    CT HEAD:  4 mm acute/subacute subdural hematoma on the right. No significant mass   effect or midline shift.    Right frontal scalp hematoma measuring up to approximately 1.7 cm in   depth. No calvarial fracture.    This report was discussed with patient's covering emergency room   physician, at 2024 6:17 PM.    CT CERVICAL SPINE:  No acute fracture or traumatic subluxation.    Multi-level degenerative changes.    Small left pleural effusion. Nonspecific groundglass opacity in the right   upper lobe, most likely infectious or inflammatory in etiology.    < end of copied text >      < from: Xray Chest 1 View- PORTABLE-Urgent (Xray Chest 1 View- PORTABLE-Urgent .) (24 @ 19:20) >    IMPRESSION: Multilobar pneumonia versus CHF.    < end of copied text >      < from: Xray Pelvis AP only (24 @ 19:22) >    IMPRESSION:    No evidence of acute injury.    < end of copied text >      < from: CT Head No Cont (24 @ 01:08) >    IMPRESSION:  As compared to the prior exam from 2024 at approximately 5:30 PM,   there is grossly stable to slightly improved appearance of acute subdural   hematoma overlying the right frontotemporal lobes. No new sites of   intracranial hemorrhage or new mass effect identified.    < end of copied text >    < from: CT Chest w/ IV Cont (24 @ 01:32) >  IMPRESSION:  Patchy bilateral pulmonary opacities most confluent in the right upper   lobe along with small bilateral pleural effusions.    < end of copied text >      < from: CT Head No Cont (24 @ 17:31) >  IMPRESSION:  A small subdural hemorrhage overlying the right frontal and temporal   lobes measures up to 2 mm in thickness in the posterior right temporal   region, progressively decreased in sizefrom 3 mm on 2024 at 12:54   AM and 4 mm at 2024 at 5:44 PM.    < end of copied text >           CONSULT NOTE    VIRAJ HURT  [id number]  89yMale    Patient is a 89y old  Male who presents with a chief complaint of SDH (23 Sep 2024 10:08)    HPI: 7-year-old male patient with history dementia, hypertension, on ASA81 presents to the ED status post fall.  Patient was attempting to get out of his bed and fell to the ground, hitting right side of head on the floor, witnessed by son. No LOC, on presentation patient complaining of R scalp hematoma , GCS 15 , UE and LE motor and sensory intact       " 89-year-old male patient with history dementia, hypertension, on ASA81 presents to the ED status post fall.  Patient was attempting to get out of his bed and fell to the ground, hitting right side of head on the floor, witnessed by son. No LOC. Pt has large hematoma to his frontal scalp. Denies any current pain, other than hematoma or other injury. No further complaints at this time. Neurosurgery consulted for R SDH. "    Patient chart reviewed since admission and patient interviewed and examined at bedside in ICU.  Relates falling, Aware of being in hospital;  Denies any headache, dizziness, paresthesia paresis, palpitations, dyspnea, chest pain, nausea, vomiting, abdominal pain.  Voiding, no bowel movement since admission.    PAST MEDICAL & SURGICAL HISTORY:  HTN (hypertension)  CAD (coronary artery disease)  Dementia  Hypothyroidism  BPH  Anxiety  CHF  Pulmonary Hypertension  AS s/p TAVR  Mastoidectomy    Allergies: Denies any allergies    Home Medications:  - ALPRAZolam 0.25 mg oral tablet: 1 tab(s) orally once a day (21 Sep 2024 21:07)  - amLODIPine 10 mg oral tablet: 1 tab(s) orally once a day (21 Sep 2024 21:07)  - atorvastatin 20 mg oral tablet: 1 tab(s) orally once a day (21 Sep 2024 21:07)  - escitalopram 20 mg oral tablet: 1 tab(s) orally once a day (21 Sep 2024 21:07)  - hydrALAZINE 10 mg oral tablet: 1 tab(s) orally once a day (21 Sep 2024 21:07) twice daily   ipratropium-albuterol 0.5 mg-2.5 mg/3 mL inhalation solution: 3 milliliter(s) by nebulizer once a day (21 Sep 2024 21:07)  - Lasix 40 mg oral tablet: 1 tab(s) orally once a day (21 Sep 2024 21:07)  - levothyroxine 100 mcg (0.1 mg) oral tablet: 1 tab(s) orally once a day (21 Sep 2024 21:07)  - losartan 50 mg oral tablet: 1 tab(s) orally once a day (21 Sep 2024 21:07)  - mirtazapine 15 mg oral tablet: 1 tab(s) orally once a day (21 Sep 2024 21:07)  - tadalafil 5 mg oral tablet: 1 tab(s) orally once a day (21 Sep 2024 21:07)  - tamsulosin 0.4 mg oral capsule: 1 cap(s) orally once a day (21 Sep 2024 21:07)  - KCL 20mEQ daily    FAMILY HISTORY: Unable to Obtain      Social History: Retired ; Denies any smoking, alcohol or drug use. Dependent on ADLs as lives in Assisted Living.    ROS:     MEDICATIONS  (STANDING):  amLODIPine   Tablet 10 milliGRAM(s) Oral every 24 hours  atorvastatin 20 milliGRAM(s) Oral at bedtime  cefTRIAXone Injectable. 1000 milliGRAM(s) IV Push every 24 hours  chlorhexidine 2% Cloths 1 Application(s) Topical <User Schedule>  enoxaparin Injectable 30 milliGRAM(s) SubCutaneous every 12 hours  escitalopram 20 milliGRAM(s) Oral daily  furosemide    Tablet 40 milliGRAM(s) Oral daily  haloperidol    Injectable 2.5 milliGRAM(s) IV Push once  levothyroxine 88 MICROGram(s) Oral daily  polyethylene glycol 3350 17 Gram(s) Oral every 24 hours  tamsulosin 0.4 milliGRAM(s) Oral at bedtime      MEDICATIONS  (PRN):  acetaminophen   IVPB .. 1000 milliGRAM(s) IV Intermittent every 6 hours PRN Mild Pain (1 - 3), Moderate Pain (4 - 6), Severe Pain (7 - 10)      OBJECTIVE:  Vital Signs Last 24 Hrs  T(C): 36.7 (23 Sep 2024 07:31), Max: 37.6 (22 Sep 2024 23:58)  T(F): 98 (23 Sep 2024 07:31), Max: 99.7 (22 Sep 2024 23:58)  HR: 67 (23 Sep 2024 09:00) (59 - 82)  BP: 137/66 (23 Sep 2024 08:00) (100/77 - 141/65)  BP(mean): 85 (23 Sep 2024 08:00) (55 - 102)  RR: 21 (23 Sep 2024 09:00) (12 - 27)  SpO2: 96% (23 Sep 2024 09:00) (92% - 99%)    Parameters below as of 23 Sep 2024 09:00  Patient On (Oxygen Delivery Method): nasal cannula  O2 Flow (L/min): 2      Orthostatic VS      Daily     Daily Weight in k.7 (23 Sep 2024 01:55)    I&O's Summary    22 Sep 2024 07:01  -  23 Sep 2024 07:00  --------------------------------------------------------  IN: 0 mL / OUT: 625 mL / NET: -625 mL    23 Sep 2024 07:01  -  23 Sep 2024 13:13  --------------------------------------------------------  IN: 0 mL / OUT: 400 mL / NET: -400 mL        PHYSICAL EXAMINATION  General: Elderly male lying on bed, comfortable  HEENT:  Right forehead bump and bruising. pupils equal, responsive, reactive to light and accomodation extraocular movements intact   NECK:  Supple  CVS: regular rate and rhythm S1 S2  RESP:  Clear to auscultation bilaterally  GI:  Soft nondistended nontender BS+  : No suprapubic tenderness  MSK:  Raises all extremities off bed  CNS:  Awake, alert, oriented to place and person only. CN II-XII intact. Strength 5/5 UE bilaterally 4+/5 LE. No drift  INTEG:  Warm dry skin  PSYCH:  Fair mood, forgetful    MONITOR:  CAPILLARY BLOOD GLUCOSE                                8.7    2.77  )-----------( 114      ( 23 Sep 2024 02:15 )             27.9     PT/INR - ( 22 Sep 2024 04:07 )   PT: 16.7 sec;   INR: 1.52 ratio         PTT - ( 22 Sep 2024 04:07 )  PTT:33.4 sec      140  |  108  |  23.5[H]  ----------------------------<  103[H]  4.0   |  22.0  |  1.24    Ca    7.8[L]      23 Sep 2024 02:15  Phos  3.1       Mg     2.2         TPro  8.2  /  Alb  3.6  /  TBili  2.0  /  DBili  x   /  AST  23  /  ALT  13  /  AlkPhos  103          Urinalysis Basic - ( 23 Sep 2024 02:15 )    Color: x / Appearance: x / SG: x / pH: x  Gluc: 103 mg/dL / Ketone: x  / Bili: x / Urobili: x   Blood: x / Protein: x / Nitrite: x   Leuk Esterase: x / RBC: x / WBC x   Sq Epi: x / Non Sq Epi: x / Bacteria: x            TTE:    < from: TTE W or WO Ultrasound Enhancing Agent (24 @ 13:35) >  CONCLUSIONS:      1. Left ventricular cavity is normal in size. Left ventricular wall thickness is mildly increased. Left ventricular systolic function is normal with an ejection fraction of 65 % by Arroyo's method of disks.   2. Mild left ventricular hypertrophy.   3. The left ventricular diastolic function is indeterminate.   4. Mildly enlarged right ventricular cavity size, with normal wall thickness, and mildly reduced right ventricular systolic function.   5. Left atrium is severely dilated.   6. The right atrium is severely dilated.   7. Device lead is visualized in the right heart.   8. Structurally normal mitral valve with normal leaflet excursion.   9. Mild to moderate mitral regurgitation.  10. A Transcatheter deployed (TAVR) valve replacement is present in the aortic position The prosthetic valve is well seated. No intravalvular regurgitation Normal valve gradients. DOI 0.38, peak gradient 16 mm Hg, no AI seen.  11. Structurally normal tricuspid valve with normal leaflet excursion. Moderate tricuspid regurgitation.  12. Dilated pulmonary artery.  13. Estimated pulmonary artery systolic pressure is 40 mmHg, consistent with mild pulmonary hypertension.  14. No pericardial effusion seen.    < end of copied text >      RADIOLOGY    < from: CT Cervical Spine No Cont (24 @ 17:47) >  IMPRESSION:    CT HEAD:  4 mm acute/subacute subdural hematoma on the right. No significant mass   effect or midline shift.    Right frontal scalp hematoma measuring up to approximately 1.7 cm in   depth. No calvarial fracture.    This report was discussed with patient's covering emergency room   physician, at 2024 6:17 PM.    CT CERVICAL SPINE:  No acute fracture or traumatic subluxation.    Multi-level degenerative changes.    Small left pleural effusion. Nonspecific groundglass opacity in the right   upper lobe, most likely infectious or inflammatory in etiology.    < end of copied text >      < from: Xray Chest 1 View- PORTABLE-Urgent (Xray Chest 1 View- PORTABLE-Urgent .) (24 @ 19:20) >    IMPRESSION: Multilobar pneumonia versus CHF.    < end of copied text >      < from: Xray Pelvis AP only (24 @ 19:22) >    IMPRESSION:    No evidence of acute injury.    < end of copied text >      < from: CT Head No Cont (24 @ 01:08) >    IMPRESSION:  As compared to the prior exam from 2024 at approximately 5:30 PM,   there is grossly stable to slightly improved appearance of acute subdural   hematoma overlying the right frontotemporal lobes. No new sites of   intracranial hemorrhage or new mass effect identified.    < end of copied text >    < from: CT Chest w/ IV Cont (24 @ 01:32) >  IMPRESSION:  Patchy bilateral pulmonary opacities most confluent in the right upper   lobe along with small bilateral pleural effusions.    < end of copied text >      < from: CT Head No Cont (24 @ 17:31) >  IMPRESSION:  A small subdural hemorrhage overlying the right frontal and temporal   lobes measures up to 2 mm in thickness in the posterior right temporal   region, progressively decreased in sizefrom 3 mm on 2024 at 12:54   AM and 4 mm at 2024 at 5:44 PM.    < end of copied text >        Geriatric Screening:    Functional Assessment: [ ] Independent [X ] Assistance [ ] Total care [] Non-ambulatory    [ X] Fall risks identified:      [ X] High risk medications identified: BDZ    [ ] Probable osteoporosis    [ ] Possible osteomalacia    [ X] Increased delirium risk    [ X] Delirium and other risks can be reduced by:    -early ambulation    -minimizing "tethers" - IV, oxygen, catheters, etc    -avoiding hypnotics and sedatives    -maintaining hydration/nutrition    -avoid anticholinergics - diphenhydramine, etc    -pain control    -supportive environment    Advanced Directives: [ ] DNR [ ] No feeding tube [ ] MOLST in chart [ ] MOLST completed today [ X] Unknown

## 2024-09-23 NOTE — PROGRESS NOTE ADULT - SUBJECTIVE AND OBJECTIVE BOX
INTERVAL HPI/OVERNIGHT EVENTS:      SUBJECTIVE:    ICU Vital Signs Last 24 Hrs  T(C): 36.7 (23 Sep 2024 07:31), Max: 37.6 (22 Sep 2024 23:58)  T(F): 98 (23 Sep 2024 07:31), Max: 99.7 (22 Sep 2024 23:58)  HR: 67 (23 Sep 2024 09:00) (59 - 82)  BP: 137/66 (23 Sep 2024 08:00) (100/77 - 141/65)  BP(mean): 85 (23 Sep 2024 08:00) (55 - 102)  ABP: --  ABP(mean): --  RR: 21 (23 Sep 2024 09:00) (8 - 27)  SpO2: 96% (23 Sep 2024 09:00) (92% - 99%)    O2 Parameters below as of 23 Sep 2024 09:00  Patient On (Oxygen Delivery Method): nasal cannula  O2 Flow (L/min): 2        I&O's Detail    22 Sep 2024 07:01  -  23 Sep 2024 07:00  --------------------------------------------------------  IN:  Total IN: 0 mL    OUT:    Voided (mL): 625 mL  Total OUT: 625 mL    Total NET: -625 mL      23 Sep 2024 07:01  -  23 Sep 2024 10:09  --------------------------------------------------------  IN:  Total IN: 0 mL    OUT:    Voided (mL): 400 mL  Total OUT: 400 mL    Total NET: -400 mL          MEDICATIONS  (STANDING):  amLODIPine   Tablet 10 milliGRAM(s) Oral every 24 hours  atorvastatin 20 milliGRAM(s) Oral at bedtime  cefTRIAXone Injectable. 1000 milliGRAM(s) IV Push every 24 hours  chlorhexidine 2% Cloths 1 Application(s) Topical <User Schedule>  enoxaparin Injectable 30 milliGRAM(s) SubCutaneous every 12 hours  escitalopram 20 milliGRAM(s) Oral daily  furosemide    Tablet 40 milliGRAM(s) Oral daily  levothyroxine 88 MICROGram(s) Oral daily  polyethylene glycol 3350 17 Gram(s) Oral every 24 hours  tamsulosin 0.4 milliGRAM(s) Oral at bedtime    MEDICATIONS  (PRN):  acetaminophen   IVPB .. 1000 milliGRAM(s) IV Intermittent every 6 hours PRN Mild Pain (1 - 3), Moderate Pain (4 - 6), Severe Pain (7 - 10)    Drug Dosing Weight  Height (cm): 167.6 (21 Sep 2024 22:53)  Weight (kg): 86.8 (21 Sep 2024 22:53)  BMI (kg/m2): 30.9 (21 Sep 2024 22:53)  BSA (m2): 1.96 (21 Sep 2024 22:53)    LABS:    CBC Full  -  ( 23 Sep 2024 02:15 )  WBC Count : 2.77 K/uL  RBC Count : 2.72 M/uL  Hemoglobin : 8.7 g/dL  Hematocrit : 27.9 %  Platelet Count - Automated : 114 K/uL  Mean Cell Volume : 102.6 fl  Mean Cell Hemoglobin : 32.0 pg  Mean Cell Hemoglobin Concentration : 31.2 gm/dL  Auto Neutrophil # : 2.20 K/uL  Auto Lymphocyte # : 0.39 K/uL  Auto Monocyte # : 0.12 K/uL  Auto Eosinophil # : 0.05 K/uL  Auto Basophil # : 0.00 K/uL  Auto Neutrophil % : 79.6 %  Auto Lymphocyte % : 14.2 %  Auto Monocyte % : 4.4 %  Auto Eosinophil % : 1.8 %  Auto Basophil % : 0.0 %    09-23    140  |  108  |  23.5[H]  ----------------------------<  103[H]  4.0   |  22.0  |  1.24    Ca    7.8[L]      23 Sep 2024 02:15  Phos  3.1     09-23  Mg     2.2     09-23    TPro  8.2  /  Alb  3.6  /  TBili  2.0  /  DBili  x   /  AST  23  /  ALT  13  /  AlkPhos  103  09-21    PT/INR - ( 22 Sep 2024 04:07 )   PT: 16.7 sec;   INR: 1.52 ratio         PTT - ( 22 Sep 2024 04:07 )  PTT:33.4 sec  Urinalysis Basic - ( 23 Sep 2024 02:15 )    Color: x / Appearance: x / SG: x / pH: x  Gluc: 103 mg/dL / Ketone: x  / Bili: x / Urobili: x   Blood: x / Protein: x / Nitrite: x   Leuk Esterase: x / RBC: x / WBC x   Sq Epi: x / Non Sq Epi: x / Bacteria: x        Physical Exam:  GENERAL:  NEUROLOGIC:  HEENT:  NECK:  HEART:  CHEST/LUNG:  ABDOMEN:  EXTREMITIES:  SKIN:    PATIENT CARE ACCESS DEVICES:  [x ] Peripheral IV  [ ] Central Venous Line	[ ] R	[ ] L	[ ] IJ	[ ] Fem	[ ] SC	   [ ] Arterial Line		[ ] R	[ ] L	[ ] Fem	[ ] Rad	[ ] Ax	   [ ] PICC:					[ ] Mediport  [ ] Urinary Catheter:   [ ] Necessity of urinary, arterial, and venous catheters discussed           INTERVAL HPI/OVERNIGHT EVENTS:      SUBJECTIVE:    ICU Vital Signs Last 24 Hrs  T(C): 36.7 (23 Sep 2024 07:31), Max: 37.6 (22 Sep 2024 23:58)  T(F): 98 (23 Sep 2024 07:31), Max: 99.7 (22 Sep 2024 23:58)  HR: 67 (23 Sep 2024 09:00) (59 - 82)  BP: 137/66 (23 Sep 2024 08:00) (100/77 - 141/65)  BP(mean): 85 (23 Sep 2024 08:00) (55 - 102)  ABP: --  ABP(mean): --  RR: 21 (23 Sep 2024 09:00) (8 - 27)  SpO2: 96% (23 Sep 2024 09:00) (92% - 99%)    O2 Parameters below as of 23 Sep 2024 09:00  Patient On (Oxygen Delivery Method): nasal cannula  O2 Flow (L/min): 2        I&O's Detail    22 Sep 2024 07:01  -  23 Sep 2024 07:00  --------------------------------------------------------  IN:  Total IN: 0 mL    OUT:    Voided (mL): 625 mL  Total OUT: 625 mL    Total NET: -625 mL      23 Sep 2024 07:01  -  23 Sep 2024 10:09  --------------------------------------------------------  IN:  Total IN: 0 mL    OUT:    Voided (mL): 400 mL  Total OUT: 400 mL    Total NET: -400 mL          MEDICATIONS  (STANDING):  amLODIPine   Tablet 10 milliGRAM(s) Oral every 24 hours  atorvastatin 20 milliGRAM(s) Oral at bedtime  cefTRIAXone Injectable. 1000 milliGRAM(s) IV Push every 24 hours  chlorhexidine 2% Cloths 1 Application(s) Topical <User Schedule>  enoxaparin Injectable 30 milliGRAM(s) SubCutaneous every 12 hours  escitalopram 20 milliGRAM(s) Oral daily  furosemide    Tablet 40 milliGRAM(s) Oral daily  levothyroxine 88 MICROGram(s) Oral daily  polyethylene glycol 3350 17 Gram(s) Oral every 24 hours  tamsulosin 0.4 milliGRAM(s) Oral at bedtime    MEDICATIONS  (PRN):  acetaminophen   IVPB .. 1000 milliGRAM(s) IV Intermittent every 6 hours PRN Mild Pain (1 - 3), Moderate Pain (4 - 6), Severe Pain (7 - 10)    Drug Dosing Weight  Height (cm): 167.6 (21 Sep 2024 22:53)  Weight (kg): 86.8 (21 Sep 2024 22:53)  BMI (kg/m2): 30.9 (21 Sep 2024 22:53)  BSA (m2): 1.96 (21 Sep 2024 22:53)    LABS:    CBC Full  -  ( 23 Sep 2024 02:15 )  WBC Count : 2.77 K/uL  RBC Count : 2.72 M/uL  Hemoglobin : 8.7 g/dL  Hematocrit : 27.9 %  Platelet Count - Automated : 114 K/uL  Mean Cell Volume : 102.6 fl  Mean Cell Hemoglobin : 32.0 pg  Mean Cell Hemoglobin Concentration : 31.2 gm/dL  Auto Neutrophil # : 2.20 K/uL  Auto Lymphocyte # : 0.39 K/uL  Auto Monocyte # : 0.12 K/uL  Auto Eosinophil # : 0.05 K/uL  Auto Basophil # : 0.00 K/uL  Auto Neutrophil % : 79.6 %  Auto Lymphocyte % : 14.2 %  Auto Monocyte % : 4.4 %  Auto Eosinophil % : 1.8 %  Auto Basophil % : 0.0 %    09-23    140  |  108  |  23.5[H]  ----------------------------<  103[H]  4.0   |  22.0  |  1.24    Ca    7.8[L]      23 Sep 2024 02:15  Phos  3.1     09-23  Mg     2.2     09-23    TPro  8.2  /  Alb  3.6  /  TBili  2.0  /  DBili  x   /  AST  23  /  ALT  13  /  AlkPhos  103  09-21    PT/INR - ( 22 Sep 2024 04:07 )   PT: 16.7 sec;   INR: 1.52 ratio         PTT - ( 22 Sep 2024 04:07 )  PTT:33.4 sec  Urinalysis Basic - ( 23 Sep 2024 02:15 )    Color: x / Appearance: x / SG: x / pH: x  Gluc: 103 mg/dL / Ketone: x  / Bili: x / Urobili: x   Blood: x / Protein: x / Nitrite: x   Leuk Esterase: x / RBC: x / WBC x   Sq Epi: x / Non Sq Epi: x / Bacteria: x        Physical Exam:  GENERAL: Comfortable  NEUROLOGIC:  HEENT:  NECK:  HEART:  CHEST/LUNG:  ABDOMEN:  EXTREMITIES:  SKIN:    PATIENT CARE ACCESS DEVICES:  [x ] Peripheral IV  [ ] Central Venous Line	[ ] R	[ ] L	[ ] IJ	[ ] Fem	[ ] SC	   [ ] Arterial Line		[ ] R	[ ] L	[ ] Fem	[ ] Rad	[ ] Ax	   [ ] PICC:					[ ] Mediport  [ ] Urinary Catheter:   [ ] Necessity of urinary, arterial, and venous catheters discussed           INTERVAL HPI/OVERNIGHT EVENTS:  24 hour CTH is stable  Noted to have hematuria - found to have a UTI.  Given lasix yesterday am w/brisk response    SUBJECTIVE:  Feels well.  Eager to speak with his son about the stock market    ICU Vital Signs Last 24 Hrs  T(C): 36.7 (23 Sep 2024 07:31), Max: 37.6 (22 Sep 2024 23:58)  T(F): 98 (23 Sep 2024 07:31), Max: 99.7 (22 Sep 2024 23:58)  HR: 67 (23 Sep 2024 09:00) (59 - 82)  BP: 137/66 (23 Sep 2024 08:00) (100/77 - 141/65)  BP(mean): 85 (23 Sep 2024 08:00) (55 - 102)  ABP: --  ABP(mean): --  RR: 21 (23 Sep 2024 09:00) (8 - 27)  SpO2: 96% (23 Sep 2024 09:00) (92% - 99%)    O2 Parameters below as of 23 Sep 2024 09:00  Patient On (Oxygen Delivery Method): nasal cannula  O2 Flow (L/min): 2        I&O's Detail    22 Sep 2024 07:01  -  23 Sep 2024 07:00  --------------------------------------------------------  IN:  Total IN: 0 mL    OUT:    Voided (mL): 625 mL  Total OUT: 625 mL    Total NET: -625 mL      23 Sep 2024 07:01  -  23 Sep 2024 10:09  --------------------------------------------------------  IN:  Total IN: 0 mL    OUT:    Voided (mL): 400 mL  Total OUT: 400 mL    Total NET: -400 mL          MEDICATIONS  (STANDING):  amLODIPine   Tablet 10 milliGRAM(s) Oral every 24 hours  atorvastatin 20 milliGRAM(s) Oral at bedtime  cefTRIAXone Injectable. 1000 milliGRAM(s) IV Push every 24 hours  chlorhexidine 2% Cloths 1 Application(s) Topical <User Schedule>  enoxaparin Injectable 30 milliGRAM(s) SubCutaneous every 12 hours  escitalopram 20 milliGRAM(s) Oral daily  furosemide    Tablet 40 milliGRAM(s) Oral daily  levothyroxine 88 MICROGram(s) Oral daily  polyethylene glycol 3350 17 Gram(s) Oral every 24 hours  tamsulosin 0.4 milliGRAM(s) Oral at bedtime    MEDICATIONS  (PRN):  acetaminophen   IVPB .. 1000 milliGRAM(s) IV Intermittent every 6 hours PRN Mild Pain (1 - 3), Moderate Pain (4 - 6), Severe Pain (7 - 10)    Drug Dosing Weight  Height (cm): 167.6 (21 Sep 2024 22:53)  Weight (kg): 86.8 (21 Sep 2024 22:53)  BMI (kg/m2): 30.9 (21 Sep 2024 22:53)  BSA (m2): 1.96 (21 Sep 2024 22:53)    LABS:    CBC Full  -  ( 23 Sep 2024 02:15 )  WBC Count : 2.77 K/uL  RBC Count : 2.72 M/uL  Hemoglobin : 8.7 g/dL  Hematocrit : 27.9 %  Platelet Count - Automated : 114 K/uL  Mean Cell Volume : 102.6 fl  Mean Cell Hemoglobin : 32.0 pg  Mean Cell Hemoglobin Concentration : 31.2 gm/dL  Auto Neutrophil # : 2.20 K/uL  Auto Lymphocyte # : 0.39 K/uL  Auto Monocyte # : 0.12 K/uL  Auto Eosinophil # : 0.05 K/uL  Auto Basophil # : 0.00 K/uL  Auto Neutrophil % : 79.6 %  Auto Lymphocyte % : 14.2 %  Auto Monocyte % : 4.4 %  Auto Eosinophil % : 1.8 %  Auto Basophil % : 0.0 %    09-23    140  |  108  |  23.5[H]  ----------------------------<  103[H]  4.0   |  22.0  |  1.24    Ca    7.8[L]      23 Sep 2024 02:15  Phos  3.1     09-23  Mg     2.2     09-23    TPro  8.2  /  Alb  3.6  /  TBili  2.0  /  DBili  x   /  AST  23  /  ALT  13  /  AlkPhos  103  09-21    PT/INR - ( 22 Sep 2024 04:07 )   PT: 16.7 sec;   INR: 1.52 ratio         PTT - ( 22 Sep 2024 04:07 )  PTT:33.4 sec  Urinalysis Basic - ( 23 Sep 2024 02:15 )    Color: x / Appearance: x / SG: x / pH: x  Gluc: 103 mg/dL / Ketone: x  / Bili: x / Urobili: x   Blood: x / Protein: x / Nitrite: x   Leuk Esterase: x / RBC: x / WBC x   Sq Epi: x / Non Sq Epi: x / Bacteria: x        Physical Exam:  GENERAL: Comfortable  NEUROLOGIC: GCS 14, awake, conversant, oriented to name, hospital but not to month, mildly confused, moves all extremities  HEENT: R frontal hematoma w/associated ecchymosis/abrasion  NECK: Non tender  HEART: RRR  CHEST/LUNG: Breathing comfortably w/clear breath sounds b/l  ABDOMEN: Soft, non tender, non distended  EXTREMITIES: Trace edema  SKIN: Early changes of chronic venous stasis of b/l lower extremities w/thin, scaling, mildly erythematous at shins    PATIENT CARE ACCESS DEVICES:  [x ] Peripheral IV  [ ] Central Venous Line	[ ] R	[ ] L	[ ] IJ	[ ] Fem	[ ] SC	   [ ] Arterial Line		[ ] R	[ ] L	[ ] Fem	[ ] Rad	[ ] Ax	   [ ] PICC:					[ ] Mediport  [ ] Urinary Catheter:   [ ] Necessity of urinary, arterial, and venous catheters discussed

## 2024-09-23 NOTE — PHYSICAL THERAPY INITIAL EVALUATION ADULT - NSPTDISCHREC_GEN_A_CORE
Return to [facility] with assist for all mobility (transfers only) If facility unable to provide needed assist, would recommend [facility]

## 2024-09-23 NOTE — PHYSICAL THERAPY INITIAL EVALUATION ADULT - CRITERIA FOR SKILLED THERAPEUTIC INTERVENTIONS
Return to [facility] with assist for all mobility (transfers only) If facility unable to provide needed assist, would recommend [facility]/impairments found/anticipated discharge recommendation

## 2024-09-24 ENCOUNTER — TRANSCRIPTION ENCOUNTER (OUTPATIENT)
Age: 89
End: 2024-09-24

## 2024-09-24 ENCOUNTER — NON-APPOINTMENT (OUTPATIENT)
Age: 89
End: 2024-09-24

## 2024-09-24 VITALS
SYSTOLIC BLOOD PRESSURE: 153 MMHG | OXYGEN SATURATION: 94 % | DIASTOLIC BLOOD PRESSURE: 82 MMHG | RESPIRATION RATE: 18 BRPM | HEART RATE: 60 BPM | TEMPERATURE: 98 F

## 2024-09-24 DIAGNOSIS — I50.22 CHRONIC SYSTOLIC (CONGESTIVE) HEART FAILURE: ICD-10-CM

## 2024-09-24 DIAGNOSIS — N40.0 BENIGN PROSTATIC HYPERPLASIA WITHOUT LOWER URINARY TRACT SYMPTOMS: ICD-10-CM

## 2024-09-24 DIAGNOSIS — F14.10 COCAINE ABUSE, UNCOMPLICATED: ICD-10-CM

## 2024-09-24 DIAGNOSIS — I27.21 SECONDARY PULMONARY ARTERIAL HYPERTENSION: ICD-10-CM

## 2024-09-24 DIAGNOSIS — F41.9 ANXIETY DISORDER, UNSPECIFIED: ICD-10-CM

## 2024-09-24 DIAGNOSIS — R31.9 HEMATURIA, UNSPECIFIED: ICD-10-CM

## 2024-09-24 DIAGNOSIS — I50.32 CHRONIC DIASTOLIC (CONGESTIVE) HEART FAILURE: ICD-10-CM

## 2024-09-24 DIAGNOSIS — I10 ESSENTIAL (PRIMARY) HYPERTENSION: ICD-10-CM

## 2024-09-24 DIAGNOSIS — S06.5XAA TRAUMATIC SUBDURAL HEMORRHAGE WITH LOSS OF CONSCIOUSNESS STATUS UNKNOWN, INITIAL ENCOUNTER: ICD-10-CM

## 2024-09-24 DIAGNOSIS — I25.10 ATHEROSCLEROTIC HEART DISEASE OF NATIVE CORONARY ARTERY WITHOUT ANGINA PECTORIS: ICD-10-CM

## 2024-09-24 LAB
ANION GAP SERPL CALC-SCNC: 10 MMOL/L — SIGNIFICANT CHANGE UP (ref 5–17)
ANISOCYTOSIS BLD QL: SLIGHT — SIGNIFICANT CHANGE UP
BASOPHILS # BLD AUTO: 0.05 K/UL — SIGNIFICANT CHANGE UP (ref 0–0.2)
BASOPHILS NFR BLD AUTO: 2.6 % — HIGH (ref 0–2)
BUN SERPL-MCNC: 23.9 MG/DL — HIGH (ref 8–20)
CALCIUM SERPL-MCNC: 8.2 MG/DL — LOW (ref 8.4–10.5)
CHLORIDE SERPL-SCNC: 106 MMOL/L — SIGNIFICANT CHANGE UP (ref 96–108)
CO2 SERPL-SCNC: 24 MMOL/L — SIGNIFICANT CHANGE UP (ref 22–29)
CREAT SERPL-MCNC: 1.35 MG/DL — HIGH (ref 0.5–1.3)
EGFR: 48 ML/MIN/1.73M2 — LOW
ELLIPTOCYTES BLD QL SMEAR: SLIGHT — SIGNIFICANT CHANGE UP
EOSINOPHIL # BLD AUTO: 0.03 K/UL — SIGNIFICANT CHANGE UP (ref 0–0.5)
EOSINOPHIL NFR BLD AUTO: 1.7 % — SIGNIFICANT CHANGE UP (ref 0–6)
GIANT PLATELETS BLD QL SMEAR: PRESENT — SIGNIFICANT CHANGE UP
GLUCOSE SERPL-MCNC: 104 MG/DL — HIGH (ref 70–99)
HCT VFR BLD CALC: 27.7 % — LOW (ref 39–50)
HGB BLD-MCNC: 8.8 G/DL — LOW (ref 13–17)
LYMPHOCYTES # BLD AUTO: 0.18 K/UL — LOW (ref 1–3.3)
LYMPHOCYTES # BLD AUTO: 9.6 % — LOW (ref 13–44)
MACROCYTES BLD QL: SLIGHT — SIGNIFICANT CHANGE UP
MAGNESIUM SERPL-MCNC: 2.1 MG/DL — SIGNIFICANT CHANGE UP (ref 1.6–2.6)
MANUAL SMEAR VERIFICATION: SIGNIFICANT CHANGE UP
MCHC RBC-ENTMCNC: 31.8 GM/DL — LOW (ref 32–36)
MCHC RBC-ENTMCNC: 32.5 PG — SIGNIFICANT CHANGE UP (ref 27–34)
MCV RBC AUTO: 102.2 FL — HIGH (ref 80–100)
MONOCYTES # BLD AUTO: 0.08 K/UL — SIGNIFICANT CHANGE UP (ref 0–0.9)
MONOCYTES NFR BLD AUTO: 4.3 % — SIGNIFICANT CHANGE UP (ref 2–14)
NEUTROPHILS # BLD AUTO: 1.53 K/UL — LOW (ref 1.8–7.4)
NEUTROPHILS NFR BLD AUTO: 80.9 % — HIGH (ref 43–77)
OVALOCYTES BLD QL SMEAR: SIGNIFICANT CHANGE UP
PHOSPHATE SERPL-MCNC: 3.3 MG/DL — SIGNIFICANT CHANGE UP (ref 2.4–4.7)
PLAT MORPH BLD: NORMAL — SIGNIFICANT CHANGE UP
PLATELET # BLD AUTO: 100 K/UL — LOW (ref 150–400)
POIKILOCYTOSIS BLD QL AUTO: SIGNIFICANT CHANGE UP
POLYCHROMASIA BLD QL SMEAR: SLIGHT — SIGNIFICANT CHANGE UP
POTASSIUM SERPL-MCNC: 3.8 MMOL/L — SIGNIFICANT CHANGE UP (ref 3.5–5.3)
POTASSIUM SERPL-SCNC: 3.8 MMOL/L — SIGNIFICANT CHANGE UP (ref 3.5–5.3)
RBC # BLD: 2.71 M/UL — LOW (ref 4.2–5.8)
RBC # FLD: 16.4 % — HIGH (ref 10.3–14.5)
RBC BLD AUTO: ABNORMAL
SCHISTOCYTES BLD QL AUTO: SLIGHT — SIGNIFICANT CHANGE UP
SODIUM SERPL-SCNC: 140 MMOL/L — SIGNIFICANT CHANGE UP (ref 135–145)
VARIANT LYMPHS # BLD: 0.9 % — SIGNIFICANT CHANGE UP (ref 0–6)
WBC # BLD: 1.89 K/UL — LOW (ref 3.8–10.5)
WBC # FLD AUTO: 1.89 K/UL — LOW (ref 3.8–10.5)

## 2024-09-24 PROCEDURE — 83735 ASSAY OF MAGNESIUM: CPT

## 2024-09-24 PROCEDURE — 87640 STAPH A DNA AMP PROBE: CPT

## 2024-09-24 PROCEDURE — 93010 ELECTROCARDIOGRAM REPORT: CPT

## 2024-09-24 PROCEDURE — 87641 MR-STAPH DNA AMP PROBE: CPT

## 2024-09-24 PROCEDURE — 84436 ASSAY OF TOTAL THYROXINE: CPT

## 2024-09-24 PROCEDURE — 83036 HEMOGLOBIN GLYCOSYLATED A1C: CPT

## 2024-09-24 PROCEDURE — 72125 CT NECK SPINE W/O DYE: CPT | Mod: MC

## 2024-09-24 PROCEDURE — 80048 BASIC METABOLIC PNL TOTAL CA: CPT

## 2024-09-24 PROCEDURE — 85610 PROTHROMBIN TIME: CPT

## 2024-09-24 PROCEDURE — 71045 X-RAY EXAM CHEST 1 VIEW: CPT

## 2024-09-24 PROCEDURE — 99285 EMERGENCY DEPT VISIT HI MDM: CPT | Mod: 25

## 2024-09-24 PROCEDURE — 81003 URINALYSIS AUTO W/O SCOPE: CPT

## 2024-09-24 PROCEDURE — 99232 SBSQ HOSP IP/OBS MODERATE 35: CPT

## 2024-09-24 PROCEDURE — 93005 ELECTROCARDIOGRAM TRACING: CPT

## 2024-09-24 PROCEDURE — 99233 SBSQ HOSP IP/OBS HIGH 50: CPT

## 2024-09-24 PROCEDURE — 84100 ASSAY OF PHOSPHORUS: CPT

## 2024-09-24 PROCEDURE — 85027 COMPLETE CBC AUTOMATED: CPT

## 2024-09-24 PROCEDURE — 83880 ASSAY OF NATRIURETIC PEPTIDE: CPT

## 2024-09-24 PROCEDURE — 93307 TTE W/O DOPPLER COMPLETE: CPT

## 2024-09-24 PROCEDURE — 85025 COMPLETE CBC W/AUTO DIFF WBC: CPT

## 2024-09-24 PROCEDURE — 81001 URINALYSIS AUTO W/SCOPE: CPT

## 2024-09-24 PROCEDURE — 84145 PROCALCITONIN (PCT): CPT

## 2024-09-24 PROCEDURE — 84443 ASSAY THYROID STIM HORMONE: CPT

## 2024-09-24 PROCEDURE — 85730 THROMBOPLASTIN TIME PARTIAL: CPT

## 2024-09-24 PROCEDURE — 12001 RPR S/N/AX/GEN/TRNK 2.5CM/<: CPT

## 2024-09-24 PROCEDURE — 82962 GLUCOSE BLOOD TEST: CPT

## 2024-09-24 PROCEDURE — 70450 CT HEAD/BRAIN W/O DYE: CPT | Mod: MC

## 2024-09-24 PROCEDURE — 84480 ASSAY TRIIODOTHYRONINE (T3): CPT

## 2024-09-24 PROCEDURE — 82607 VITAMIN B-12: CPT

## 2024-09-24 PROCEDURE — 71260 CT THORAX DX C+: CPT | Mod: MC

## 2024-09-24 PROCEDURE — 80061 LIPID PANEL: CPT

## 2024-09-24 PROCEDURE — 80053 COMPREHEN METABOLIC PANEL: CPT

## 2024-09-24 PROCEDURE — 36415 COLL VENOUS BLD VENIPUNCTURE: CPT

## 2024-09-24 PROCEDURE — 93306 TTE W/DOPPLER COMPLETE: CPT

## 2024-09-24 PROCEDURE — 72170 X-RAY EXAM OF PELVIS: CPT

## 2024-09-24 RX ORDER — SODIUM CHLORIDE IRRIG SOLUTION 0.9 %
500 SOLUTION, IRRIGATION IRRIGATION ONCE
Refills: 0 | Status: COMPLETED | OUTPATIENT
Start: 2024-09-24 | End: 2024-09-24

## 2024-09-24 RX ORDER — ESCITALOPRAM OXALATE 10 MG
1 TABLET ORAL
Qty: 0 | Refills: 0 | DISCHARGE
Start: 2024-09-24

## 2024-09-24 RX ORDER — ESCITALOPRAM OXALATE 10 MG
1 TABLET ORAL
Refills: 0 | DISCHARGE

## 2024-09-24 RX ORDER — QUETIAPINE FUMARATE 50 MG/1
12.5 TABLET, FILM COATED ORAL AT BEDTIME
Refills: 0 | Status: DISCONTINUED | OUTPATIENT
Start: 2024-09-24 | End: 2024-09-24

## 2024-09-24 RX ORDER — ACETAMINOPHEN 325 MG
975 TABLET ORAL EVERY 6 HOURS
Refills: 0 | Status: DISCONTINUED | OUTPATIENT
Start: 2024-09-24 | End: 2024-09-24

## 2024-09-24 RX ORDER — SODIUM CHLORIDE IRRIG SOLUTION 0.9 %
1000 SOLUTION, IRRIGATION IRRIGATION
Refills: 0 | Status: DISCONTINUED | OUTPATIENT
Start: 2024-09-24 | End: 2024-09-24

## 2024-09-24 RX ORDER — HALOPERIDOL LACTATE 2 MG/ML
2.5 CONCENTRATE, ORAL ORAL ONCE
Refills: 0 | Status: COMPLETED | OUTPATIENT
Start: 2024-09-24 | End: 2024-09-24

## 2024-09-24 RX ORDER — ACETAMINOPHEN 325 MG
3 TABLET ORAL
Qty: 0 | Refills: 0 | DISCHARGE
Start: 2024-09-24

## 2024-09-24 RX ADMIN — Medication 88 MICROGRAM(S): at 05:20

## 2024-09-24 RX ADMIN — Medication 17 GRAM(S): at 14:09

## 2024-09-24 RX ADMIN — Medication 20 MILLIGRAM(S): at 11:51

## 2024-09-24 RX ADMIN — Medication 2.5 MILLIGRAM(S): at 11:51

## 2024-09-24 RX ADMIN — Medication 500 MILLILITER(S): at 11:52

## 2024-09-24 RX ADMIN — ENOXAPARIN SODIUM 30 MILLIGRAM(S): 150 INJECTION SUBCUTANEOUS at 05:20

## 2024-09-24 RX ADMIN — FUROSEMIDE 40 MILLIGRAM(S): 10 INJECTION INTRAVENOUS at 05:20

## 2024-09-24 RX ADMIN — Medication 5000 UNIT(S): at 14:10

## 2024-09-24 RX ADMIN — Medication 20 MILLIEQUIVALENT(S): at 11:53

## 2024-09-24 RX ADMIN — Medication 10 MILLIGRAM(S): at 11:51

## 2024-09-24 RX ADMIN — Medication 75 MILLILITER(S): at 14:27

## 2024-09-24 RX ADMIN — CHLORHEXIDINE GLUCONATE ORAL RINSE 1 APPLICATION(S): 1.2 SOLUTION DENTAL at 05:20

## 2024-09-24 NOTE — DISCHARGE NOTE NURSING/CASE MANAGEMENT/SOCIAL WORK - NSDCPEFALRISK_GEN_ALL_CORE
For information on Fall & Injury Prevention, visit: [email or web address] OR  [email or web address] OR  https://www.cdc.gov/steadi/patient.html

## 2024-09-24 NOTE — PROGRESS NOTE ADULT - ASSESSMENT
89 year old male with Hypertension, Dementia, CAD, AS s/p TAVR, BPH, Anxiety presented with fall and Subdural Hematoma  Imaging also significant for CHF; Elevated BNP and Chest X-Ray with GGO, congestive changes. Hematuria and being treated for UTI    # Subdural Hematoma. Stable on repeat imaging. Confusion, likely related to delirium. Monitor with neurological checks. Check Orthostatics.  Delirium precautions. Consider Quetiapine 25mg QHS, Monitor EKG    # CHF, preserved EF. Underlying Pulmonary Arterial Hypertension given Tadalafil. TTE as noted above. Currently normoxic on room air. Continued on Furosemide. monitor renal functions    # Pancytopenia. Likely underlying myelosuppressive disorder No overwhelming infection or bleeding currently. Monitor CBC, may follow up with Hematology as outpatient    # Hematuria, likely traumatic. ?UTI, though urine is not clear sample given presence of epithelial cells. On IV antibiotics as per primary team    # CAD, asymptomatic. Resume antiplatelets once cleared. Continue Statin    # HTN. Amlodipine     # Dementia, at risk for delirium.     # BPH. Tamsulosin continued. Bladder scans    # Anxiety. Discontinue BDZ as may contribute to delirium. Escitalopram may be continued    - Incentive Spirometry  - VTE prophylaxis  - Mobilize OOB    Discussed with DINA Fischer.  Advised frequent reorientation

## 2024-09-24 NOTE — PROGRESS NOTE ADULT - SUBJECTIVE AND OBJECTIVE BOX
HOSPITALIST PROGRESS NOTE    VIRAJ HURT  [id number]  89yMale    Patient is a 89y old  Male who presents with a chief complaint of SDH (24 Sep 2024 09:08)      SUBJECTIVE:   Chart reviewed since last visit.   Patient seen and examined at bedside for SDH, CHF/PAH, Pancytopenia.  Confused, wants to go to hospital. Wants walker so he can walk.   Denies any headache, visual or speech changes, paresthesia or paresis      OBJECTIVE:  Vital Signs Last 24 Hrs  T(C): 37.3 (24 Sep 2024 11:43), Max: 37.3 (23 Sep 2024 19:40)  T(F): 99.2 (24 Sep 2024 11:43), Max: 99.2 (24 Sep 2024 00:11)  HR: 68 (24 Sep 2024 11:43) (63 - 83)  BP: 148/68 (24 Sep 2024 11:43) (113/62 - 151/66)  BP(mean): 83 (23 Sep 2024 16:00) (76 - 124)  RR: 17 (24 Sep 2024 11:43) (15 - 26)  SpO2: 97% (24 Sep 2024 11:43) (94% - 99%)    Parameters below as of 24 Sep 2024 11:43  Patient On (Oxygen Delivery Method): room air      PHYSICAL EXAMINATION  General: Elderly male lying on bed, comfortable  HEENT:  Right forehead bump and bruising. Pupils equal, responsive, reactive to light and accomodation extraocular movements intact   NECK:  Right neck ecchymoses. Supple  CVS: Regular rate and rhythm S1 S2  RESP:  Clear to auscultation bilaterally  GI:  Soft nondistended nontender BS+  : No suprapubic tenderness  MSK:  Raises all extremities off bed  CNS:  Awake, alert, but confused today CN II-XII intact. Strength 5/5 UE bilaterally 4+/5 LE. No drift  INTEG:  Warm dry skin  PSYCH:  Fair mood, forgetful    MONITOR:  CAPILLARY BLOOD GLUCOSE            I&O's Summary    23 Sep 2024 07:01  -  24 Sep 2024 07:00  --------------------------------------------------------  IN: 0 mL / OUT: 1125 mL / NET: -1125 mL                            8.8    1.89  )-----------( 100      ( 24 Sep 2024 05:45 )             27.7       09-24    140  |  106  |  23.9[H]  ----------------------------<  104[H]  3.8   |  24.0  |  1.35[H]    Ca    8.2[L]      24 Sep 2024 05:45  Phos  3.3     09-24  Mg     2.1     09-24          Urinalysis Basic - ( 24 Sep 2024 05:45 )    Color: x / Appearance: x / SG: x / pH: x  Gluc: 104 mg/dL / Ketone: x  / Bili: x / Urobili: x   Blood: x / Protein: x / Nitrite: x   Leuk Esterase: x / RBC: x / WBC x   Sq Epi: x / Non Sq Epi: x / Bacteria: x            TTE:    RADIOLOGY        MEDICATIONS  (STANDING):  amLODIPine   Tablet 10 milliGRAM(s) Oral every 24 hours  atorvastatin 20 milliGRAM(s) Oral at bedtime  cefTRIAXone Injectable. 1000 milliGRAM(s) IV Push every 24 hours  escitalopram 20 milliGRAM(s) Oral daily  furosemide    Tablet 40 milliGRAM(s) Oral daily  heparin   Injectable 5000 Unit(s) SubCutaneous every 8 hours  lactated ringers. 1000 milliLiter(s) (75 mL/Hr) IV Continuous <Continuous>  levothyroxine 88 MICROGram(s) Oral daily  polyethylene glycol 3350 17 Gram(s) Oral every 24 hours  QUEtiapine 12.5 milliGRAM(s) Oral at bedtime  tamsulosin 0.4 milliGRAM(s) Oral at bedtime      MEDICATIONS  (PRN):  acetaminophen     Tablet .. 975 milliGRAM(s) Oral every 6 hours PRN Mild Pain (1 - 3)

## 2024-09-24 NOTE — DISCHARGE NOTE NURSING/CASE MANAGEMENT/SOCIAL WORK - PATIENT PORTAL LINK FT
You can access the FollowMyHealth Patient Portal offered by [facility] by registering at the following website: [email or web address]. By joining [facility]’s FollowMyHealth portal, you will also be able to view your health information using other applications (apps) compatible with our system.

## 2024-09-24 NOTE — PROGRESS NOTE ADULT - SUBJECTIVE AND OBJECTIVE BOX
Subjective: patient evaluated and examined at the bedside. no overnight events. patient denies any acute complaints     MEDICATIONS  (STANDING):  amLODIPine   Tablet 10 milliGRAM(s) Oral every 24 hours  atorvastatin 20 milliGRAM(s) Oral at bedtime  cefTRIAXone Injectable. 1000 milliGRAM(s) IV Push every 24 hours  escitalopram 20 milliGRAM(s) Oral daily  furosemide    Tablet 40 milliGRAM(s) Oral daily  heparin   Injectable 5000 Unit(s) SubCutaneous every 8 hours  levothyroxine 88 MICROGram(s) Oral daily  polyethylene glycol 3350 17 Gram(s) Oral every 24 hours  potassium chloride    Tablet ER 20 milliEquivalent(s) Oral once  tamsulosin 0.4 milliGRAM(s) Oral at bedtime    MEDICATIONS  (PRN):  acetaminophen     Tablet .. 975 milliGRAM(s) Oral every 6 hours PRN Mild Pain (1 - 3)      Vital Signs Last 24 Hrs  T(C): 36.3 (24 Sep 2024 07:42), Max: 37.3 (23 Sep 2024 19:40)  T(F): 97.4 (24 Sep 2024 07:42), Max: 99.2 (24 Sep 2024 00:11)  HR: 63 (24 Sep 2024 07:42) (63 - 83)  BP: 151/66 (24 Sep 2024 07:42) (112/79 - 151/130)  BP(mean): 83 (23 Sep 2024 16:00) (76 - 138)  RR: 17 (24 Sep 2024 07:42) (15 - 26)  SpO2: 96% (24 Sep 2024 07:42) (94% - 100%)    Parameters below as of 24 Sep 2024 07:42  Patient On (Oxygen Delivery Method): room air        Physical Exam:    Constitutional: NAD, resting comfortably in bed   Respiratory: Respirations non-labored, no accessory muscle use        LABS:                        8.8    1.89  )-----------( 100      ( 24 Sep 2024 05:45 )             27.7     09-24    140  |  106  |  23.9[H]  ----------------------------<  104[H]  3.8   |  24.0  |  1.35[H]    Ca    8.2[L]      24 Sep 2024 05:45  Phos  3.3     09-24  Mg     2.1     09-24        Urinalysis Basic - ( 24 Sep 2024 05:45 )    Color: x / Appearance: x / SG: x / pH: x  Gluc: 104 mg/dL / Ketone: x  / Bili: x / Urobili: x   Blood: x / Protein: x / Nitrite: x   Leuk Esterase: x / RBC: x / WBC x   Sq Epi: x / Non Sq Epi: x / Bacteria: x        A: 89M with SDH s/p fall with encephalohematoma. Remains HD stable, effective pain control.     Plan:   dvt ppx: SQH, SCDs  - diet: reg   - Q4H neuro checks   - MM pain control  - delirium precautions   - encourage oob and IS as kriss   - f/u am labs, monitor renal fxn and replete lytes prn   - cont rocephin, will transition to PO Bactrim for UTI upon dc   - PT/OT: rc return to assisted living facility if provided with adequate assistance/services, if not able then [facility] is recommended   - tertiary examination completed, no new acute injuries   - dispo planning

## 2024-09-24 NOTE — PROGRESS NOTE ADULT - NS ATTEND AMEND GEN_ALL_CORE FT
The patient was downgraded from SICU yesterday. Tolerates diet, no new neurological deficits, mental status baseline. F/U PT eval for dispo. Complete 7day course of abx for UTI. Add Seroquel 12.5 qhs for delirium, will obtain baseline EKG for qtc.